# Patient Record
Sex: FEMALE | Race: ASIAN | Employment: OTHER | URBAN - METROPOLITAN AREA
[De-identification: names, ages, dates, MRNs, and addresses within clinical notes are randomized per-mention and may not be internally consistent; named-entity substitution may affect disease eponyms.]

---

## 2017-01-27 LAB
AMB EXT CHOLESTEROL, TOTAL: 270 MG/DL
AMB EXT HDL CHOLESTEROL: 49 MG/DL
AMB EXT LDL CHOLESTEROL, DIRECT: 192 MG/DL
AMB EXT TRIGLYCERIDES: 143 MG/DL

## 2017-02-02 ENCOUNTER — TELEPHONE (OUTPATIENT)
Dept: FAMILY MEDICINE CLINIC | Facility: CLINIC | Age: 67
End: 2017-02-02

## 2017-02-15 ENCOUNTER — OFFICE VISIT (OUTPATIENT)
Dept: FAMILY MEDICINE CLINIC | Facility: CLINIC | Age: 67
End: 2017-02-15

## 2017-02-15 VITALS
DIASTOLIC BLOOD PRESSURE: 86 MMHG | WEIGHT: 150 LBS | HEIGHT: 59 IN | BODY MASS INDEX: 30.24 KG/M2 | SYSTOLIC BLOOD PRESSURE: 146 MMHG | HEART RATE: 74 BPM | TEMPERATURE: 98 F

## 2017-02-15 DIAGNOSIS — E78.00 HYPERCHOLESTEREMIA: ICD-10-CM

## 2017-02-15 DIAGNOSIS — E66.3 OVERWEIGHT: ICD-10-CM

## 2017-02-15 DIAGNOSIS — Z00.00 WELL ADULT EXAM: Primary | ICD-10-CM

## 2017-02-15 DIAGNOSIS — Z12.11 COLON CANCER SCREENING: ICD-10-CM

## 2017-02-15 PROCEDURE — 99387 INIT PM E/M NEW PAT 65+ YRS: CPT | Performed by: FAMILY MEDICINE

## 2017-02-15 RX ORDER — PRAVASTATIN SODIUM 40 MG
40 TABLET ORAL NIGHTLY
COMMUNITY
End: 2017-08-16

## 2017-02-15 NOTE — PROGRESS NOTES
HPI:   Anjali Geronimo is a 78year old female who presents for a complete physical exam. Symptoms: is menopausal.    Switching care from previous md, in [place] Readings from Last 6 Encounters:  02/15/17 : 150 lb (68.04 kg)    Body mass index is 30. 2 146/86 mmHg  Pulse 74  Temp(Src) 97.9 °F (36.6 °C) (Oral)  Ht 4' 11\" (1.499 m)  Wt 150 lb (68.04 kg)  BMI 30.28 kg/m2  Body mass index is 30.28 kg/(m^2).    GENERAL: well developed, well nourished,in no apparent distress  SKIN: no rashes,no suspicious lesi

## 2017-02-20 ENCOUNTER — TELEPHONE (OUTPATIENT)
Dept: FAMILY MEDICINE CLINIC | Facility: CLINIC | Age: 67
End: 2017-02-20

## 2017-02-20 ENCOUNTER — OFFICE VISIT (OUTPATIENT)
Dept: FAMILY MEDICINE CLINIC | Facility: CLINIC | Age: 67
End: 2017-02-20

## 2017-02-20 VITALS
HEART RATE: 78 BPM | BODY MASS INDEX: 29.45 KG/M2 | DIASTOLIC BLOOD PRESSURE: 79 MMHG | WEIGHT: 150 LBS | SYSTOLIC BLOOD PRESSURE: 135 MMHG | HEIGHT: 60 IN | TEMPERATURE: 98 F

## 2017-02-20 DIAGNOSIS — N39.0 URINARY TRACT INFECTION, SITE UNSPECIFIED: Primary | ICD-10-CM

## 2017-02-20 LAB
APPEARANCE: CLEAR
MULTISTIX LOT#: NORMAL NUMERIC
PH, URINE: 6 (ref 4.5–8)
SPECIFIC GRAVITY: 1.01 (ref 1–1.03)
URINE-COLOR: YELLOW
UROBILINOGEN,SEMI-QN: 0.2 MG/DL (ref 0–1.9)

## 2017-02-20 PROCEDURE — 99212 OFFICE O/P EST SF 10 MIN: CPT | Performed by: PHYSICIAN ASSISTANT

## 2017-02-20 PROCEDURE — 81002 URINALYSIS NONAUTO W/O SCOPE: CPT | Performed by: PHYSICIAN ASSISTANT

## 2017-02-20 PROCEDURE — 99213 OFFICE O/P EST LOW 20 MIN: CPT | Performed by: PHYSICIAN ASSISTANT

## 2017-02-20 RX ORDER — NITROFURANTOIN 25; 75 MG/1; MG/1
100 CAPSULE ORAL 2 TIMES DAILY
Qty: 14 CAPSULE | Refills: 0 | Status: SHIPPED | OUTPATIENT
Start: 2017-02-20 | End: 2017-02-20

## 2017-02-20 RX ORDER — CIPROFLOXACIN 500 MG/1
500 TABLET, FILM COATED ORAL 2 TIMES DAILY
Qty: 14 TABLET | Refills: 0 | Status: SHIPPED | OUTPATIENT
Start: 2017-02-20 | End: 2017-02-27

## 2017-02-20 NOTE — TELEPHONE ENCOUNTER
Reason for Call/Chief Complaint: ?UTI  Onset: 3 days ago  Nursing Assessment/Associated Symptoms: burning, frequency  ____________________________________________________  Medication List reviewed: no  Allergies verified: yes  _____________________________

## 2017-02-20 NOTE — TELEPHONE ENCOUNTER
Pt is calling regarding a medication that was by her previous doc   Pt is having some side effects burning sensation after using the bathroom  Pt has already stopped taking the the medication and is still having the side effects   Please advise

## 2017-02-21 NOTE — PROGRESS NOTES
HPI:    Patient ID: Ty Ghotra is a 78year old female. HPI Comments: Patient presents complaining of burning with urination for past three days. She has intermittent pain lower abdomen. She denies flank pain, fever or vomiting.   She states had si encounter diagnosis)  -Cipro 500 mg BID for 7 days was sent to pharmacy. Medication discussed. Advised patient to push fluids. Proper urinary hygiene discussed.   Advised patient to follow up in one week if symptoms persist or sooner with any symptom wor

## 2017-02-23 ENCOUNTER — TELEPHONE (OUTPATIENT)
Dept: FAMILY MEDICINE CLINIC | Facility: CLINIC | Age: 67
End: 2017-02-23

## 2017-02-23 NOTE — TELEPHONE ENCOUNTER
Daughter calling in states pt is going out of country and needs a meningitis vaccine. Daughter states she needs this asap, due to leaving on 03/07    Please advise.      Call, [phone]

## 2017-02-28 NOTE — TELEPHONE ENCOUNTER
Patient can get meningococcal vaccine if she is traveling to [place]    See  CDC recommendations    All adults and children >3years of age must have received a single dose of quadrivalent A/C/Y/W-135 vaccine and must show proof of vaccination on a valid Int

## 2017-03-01 ENCOUNTER — OFFICE VISIT (OUTPATIENT)
Dept: FAMILY MEDICINE CLINIC | Facility: CLINIC | Age: 67
End: 2017-03-01

## 2017-03-01 VITALS
HEIGHT: 60 IN | BODY MASS INDEX: 29.45 KG/M2 | WEIGHT: 150 LBS | TEMPERATURE: 98 F | HEART RATE: 73 BPM | DIASTOLIC BLOOD PRESSURE: 69 MMHG | SYSTOLIC BLOOD PRESSURE: 113 MMHG

## 2017-03-01 DIAGNOSIS — N30.00 ACUTE CYSTITIS WITHOUT HEMATURIA: Primary | ICD-10-CM

## 2017-03-01 LAB
APPEARANCE: CLEAR
BACTERIA UR QL AUTO: NEGATIVE /HPF
BILIRUB UR QL: NEGATIVE
BILIRUBIN: NEGATIVE
CLARITY UR: CLEAR
COLOR UR: COLORLESS
GLUCOSE (URINE DIPSTICK): NEGATIVE MG/DL
GLUCOSE UR-MCNC: NEGATIVE MG/DL
KETONES (URINE DIPSTICK): NEGATIVE MG/DL
KETONES UR-MCNC: NEGATIVE MG/DL
LEUKOCYTE ESTERASE UR QL STRIP.AUTO: NEGATIVE
LEUKOCYTES: NEGATIVE
MULTISTIX LOT#: NORMAL NUMERIC
NITRITE UR QL STRIP.AUTO: NEGATIVE
NITRITE, URINE: NEGATIVE
PH UR: 6 [PH] (ref 5–8)
PH, URINE: 5 (ref 4.5–8)
PROT UR-MCNC: NEGATIVE MG/DL
PROTEIN (URINE DIPSTICK): NEGATIVE MG/DL
RBC #/AREA URNS AUTO: 0 /HPF
SP GR UR STRIP: 1.01 (ref 1–1.03)
SPECIFIC GRAVITY: 1 (ref 1–1.03)
URINE-COLOR: YELLOW
UROBILINOGEN UR STRIP-ACNC: <2
UROBILINOGEN,SEMI-QN: 0.2 MG/DL (ref 0–1.9)
VIT C UR-MCNC: NEGATIVE MG/DL
WBC #/AREA URNS AUTO: 0 /HPF

## 2017-03-01 PROCEDURE — 99212 OFFICE O/P EST SF 10 MIN: CPT | Performed by: FAMILY MEDICINE

## 2017-03-01 PROCEDURE — 81002 URINALYSIS NONAUTO W/O SCOPE: CPT | Performed by: FAMILY MEDICINE

## 2017-03-01 PROCEDURE — 99213 OFFICE O/P EST LOW 20 MIN: CPT | Performed by: FAMILY MEDICINE

## 2017-03-01 NOTE — PROGRESS NOTES
HPI:    Patient ID: Noah Yates is a 78year old female. HPI     Patient here for follow up  UTI  Feels better  Feels some urinary discharge, clear  Not sure if vaginal/ urinary.   Declines urine incontinence    Review of Systems   Constitutional: Neg

## 2017-03-02 ENCOUNTER — TELEPHONE (OUTPATIENT)
Dept: FAMILY MEDICINE CLINIC | Facility: CLINIC | Age: 67
End: 2017-03-02

## 2017-03-02 NOTE — TELEPHONE ENCOUNTER
----- Message from Sallie Grady MD sent at 3/2/2017  4:40 PM CST -----  No red blood cells seen on urinalysis. pls inform patient.  No medication needed  Continue to push water intake 8-10 glasses daily  Also inform patient records from [facility]

## 2017-08-15 ENCOUNTER — TELEPHONE (OUTPATIENT)
Dept: FAMILY MEDICINE CLINIC | Facility: CLINIC | Age: 67
End: 2017-08-15

## 2017-08-15 NOTE — TELEPHONE ENCOUNTER
Actions Requested: Appointment made for tomorrow at 1pm with Dr Rissa Zavaleta at [facility]  Problem: abdominal pain  Onset and Timin week, on and off  Associated Symptoms: diarrhea-3 days ago  Aggravating by:    Alleviated by:   Triage Note: Daughter with patient

## 2017-08-16 ENCOUNTER — OFFICE VISIT (OUTPATIENT)
Dept: FAMILY MEDICINE CLINIC | Facility: CLINIC | Age: 67
End: 2017-08-16

## 2017-08-16 VITALS
WEIGHT: 152 LBS | HEART RATE: 77 BPM | TEMPERATURE: 98 F | HEIGHT: 60 IN | SYSTOLIC BLOOD PRESSURE: 133 MMHG | DIASTOLIC BLOOD PRESSURE: 79 MMHG | BODY MASS INDEX: 29.84 KG/M2

## 2017-08-16 DIAGNOSIS — L81.8 POSTINFLAMMATORY HYPOPIGMENTATION: ICD-10-CM

## 2017-08-16 DIAGNOSIS — Z12.11 COLON CANCER SCREENING: ICD-10-CM

## 2017-08-16 DIAGNOSIS — R10.84 GENERALIZED ABDOMINAL PAIN: Primary | ICD-10-CM

## 2017-08-16 DIAGNOSIS — E78.00 HYPERCHOLESTEREMIA: ICD-10-CM

## 2017-08-16 DIAGNOSIS — R31.29 MICROSCOPIC HEMATURIA: ICD-10-CM

## 2017-08-16 DIAGNOSIS — H26.8 OTHER CATARACT OF BOTH EYES: ICD-10-CM

## 2017-08-16 LAB
APPEARANCE: CLEAR
BACTERIA UR QL AUTO: NEGATIVE /HPF
BILIRUB UR QL: NEGATIVE
BILIRUBIN: NEGATIVE
CLARITY UR: CLEAR
COLOR UR: YELLOW
GLUCOSE (URINE DIPSTICK): NEGATIVE MG/DL
GLUCOSE UR-MCNC: NEGATIVE MG/DL
KETONES (URINE DIPSTICK): NEGATIVE MG/DL
KETONES UR-MCNC: NEGATIVE MG/DL
LEUKOCYTE ESTERASE UR QL STRIP.AUTO: NEGATIVE
LEUKOCYTES: NEGATIVE
NITRITE UR QL STRIP.AUTO: NEGATIVE
NITRITE, URINE: NEGATIVE
PH UR: 7 [PH] (ref 5–8)
PH, URINE: 7 (ref 4.5–8)
PROT UR-MCNC: NEGATIVE MG/DL
PROTEIN (URINE DIPSTICK): NEGATIVE MG/DL
RBC #/AREA URNS AUTO: <1 /HPF
SP GR UR STRIP: 1.01 (ref 1–1.03)
SPECIFIC GRAVITY: 1 (ref 1–1.03)
UROBILINOGEN UR STRIP-ACNC: <2
UROBILINOGEN,SEMI-QN: 0.2 MG/DL (ref 0–1.9)
VIT C UR-MCNC: NEGATIVE MG/DL
WBC #/AREA URNS AUTO: <1 /HPF

## 2017-08-16 PROCEDURE — 81000 URINALYSIS NONAUTO W/SCOPE: CPT | Performed by: FAMILY MEDICINE

## 2017-08-16 PROCEDURE — 99214 OFFICE O/P EST MOD 30 MIN: CPT | Performed by: FAMILY MEDICINE

## 2017-08-16 PROCEDURE — 99212 OFFICE O/P EST SF 10 MIN: CPT | Performed by: FAMILY MEDICINE

## 2017-08-16 RX ORDER — CIPROFLOXACIN 250 MG/1
250 TABLET, FILM COATED ORAL 2 TIMES DAILY
Qty: 6 TABLET | Refills: 0 | Status: SHIPPED | OUTPATIENT
Start: 2017-08-16 | End: 2017-08-19

## 2017-08-16 NOTE — PROGRESS NOTES
HPI:    Patient ID: Flaquita Silverio is a 75year old female. Abdominal Pain   This is a new problem. Episode onset: 1 WEEK. The onset quality is gradual. The problem occurs intermittently. The problem has been gradually improving.  The pain is located in Soft. Bowel sounds are normal. There is no hepatosplenomegaly. There is tenderness. There is no guarding and no CVA tenderness.    MILD LEFT SIDE TENDERNESS              ASSESSMENT/PLAN:   Generalized abdominal pain  (primary encounter diagnosis)  Hyperchol

## 2017-08-17 ENCOUNTER — LAB ENCOUNTER (OUTPATIENT)
Dept: LAB | Age: 67
End: 2017-08-17
Attending: FAMILY MEDICINE
Payer: COMMERCIAL

## 2017-08-17 DIAGNOSIS — E78.00 HYPERCHOLESTEREMIA: ICD-10-CM

## 2017-08-17 DIAGNOSIS — R10.84 GENERALIZED ABDOMINAL PAIN: ICD-10-CM

## 2017-08-17 LAB
ALT SERPL-CCNC: 16 U/L (ref 14–54)
ANION GAP SERPL CALC-SCNC: 10 MMOL/L (ref 0–18)
AST SERPL-CCNC: 21 U/L (ref 15–41)
BASOPHILS # BLD: 0 K/UL (ref 0–0.2)
BASOPHILS NFR BLD: 1 %
BUN SERPL-MCNC: 12 MG/DL (ref 8–20)
BUN/CREAT SERPL: 14.8 (ref 10–20)
CALCIUM SERPL-MCNC: 9.8 MG/DL (ref 8.5–10.5)
CHLORIDE SERPL-SCNC: 103 MMOL/L (ref 95–110)
CHOLEST SERPL-MCNC: 294 MG/DL (ref 110–200)
CO2 SERPL-SCNC: 25 MMOL/L (ref 22–32)
CREAT SERPL-MCNC: 0.81 MG/DL (ref 0.5–1.5)
EOSINOPHIL # BLD: 0.2 K/UL (ref 0–0.7)
EOSINOPHIL NFR BLD: 2 %
ERYTHROCYTE [DISTWIDTH] IN BLOOD BY AUTOMATED COUNT: 13.6 % (ref 11–15)
GLUCOSE SERPL-MCNC: 99 MG/DL (ref 70–99)
HCT VFR BLD AUTO: 42.7 % (ref 35–48)
HDLC SERPL-MCNC: 50 MG/DL
HGB BLD-MCNC: 14.6 G/DL (ref 12–16)
LDLC SERPL CALC-MCNC: 214 MG/DL (ref 0–99)
LYMPHOCYTES # BLD: 2 K/UL (ref 1–4)
LYMPHOCYTES NFR BLD: 31 %
MCH RBC QN AUTO: 29.2 PG (ref 27–32)
MCHC RBC AUTO-ENTMCNC: 34.3 G/DL (ref 32–37)
MCV RBC AUTO: 84.9 FL (ref 80–100)
MONOCYTES # BLD: 0.5 K/UL (ref 0–1)
MONOCYTES NFR BLD: 8 %
NEUTROPHILS # BLD AUTO: 3.9 K/UL (ref 1.8–7.7)
NEUTROPHILS NFR BLD: 59 %
NONHDLC SERPL-MCNC: 244 MG/DL
OSMOLALITY UR CALC.SUM OF ELEC: 286 MOSM/KG (ref 275–295)
PLATELET # BLD AUTO: 217 K/UL (ref 140–400)
PMV BLD AUTO: 7.7 FL (ref 7.4–10.3)
POTASSIUM SERPL-SCNC: 3.9 MMOL/L (ref 3.3–5.1)
RBC # BLD AUTO: 5.02 M/UL (ref 3.7–5.4)
SODIUM SERPL-SCNC: 138 MMOL/L (ref 136–144)
TRIGL SERPL-MCNC: 150 MG/DL (ref 1–149)
WBC # BLD AUTO: 6.7 K/UL (ref 4–11)

## 2017-08-17 PROCEDURE — 80061 LIPID PANEL: CPT

## 2017-08-17 PROCEDURE — 84460 ALANINE AMINO (ALT) (SGPT): CPT

## 2017-08-17 PROCEDURE — 84450 TRANSFERASE (AST) (SGOT): CPT

## 2017-08-17 PROCEDURE — 80048 BASIC METABOLIC PNL TOTAL CA: CPT

## 2017-08-17 PROCEDURE — 85025 COMPLETE CBC W/AUTO DIFF WBC: CPT

## 2017-08-17 PROCEDURE — 36415 COLL VENOUS BLD VENIPUNCTURE: CPT

## 2017-08-26 ENCOUNTER — APPOINTMENT (OUTPATIENT)
Dept: LAB | Facility: HOSPITAL | Age: 67
End: 2017-08-26
Attending: FAMILY MEDICINE
Payer: COMMERCIAL

## 2017-08-26 DIAGNOSIS — R10.84 GENERALIZED ABDOMINAL PAIN: ICD-10-CM

## 2017-08-26 DIAGNOSIS — Z12.11 COLON CANCER SCREENING: ICD-10-CM

## 2017-08-26 LAB — HEMOCCULT STL QL: NEGATIVE

## 2017-08-26 PROCEDURE — 82274 ASSAY TEST FOR BLOOD FECAL: CPT

## 2017-08-26 PROCEDURE — 87338 HPYLORI STOOL AG IA: CPT

## 2017-08-28 LAB — HELICOBACTER PYLORI AG, FECAL: NEGATIVE

## 2017-09-06 ENCOUNTER — TELEPHONE (OUTPATIENT)
Dept: FAMILY MEDICINE CLINIC | Facility: CLINIC | Age: 67
End: 2017-09-06

## 2017-09-06 NOTE — TELEPHONE ENCOUNTER
----- Message from Rosa Elena MD sent at 9/2/2017  9:41 PM CDT -----  Test for bacteria and stool is negative as well as stool is negative for blood. Please call to inform patient.

## 2017-09-26 ENCOUNTER — OFFICE VISIT (OUTPATIENT)
Dept: FAMILY MEDICINE CLINIC | Facility: CLINIC | Age: 67
End: 2017-09-26

## 2017-09-26 VITALS
HEART RATE: 76 BPM | BODY MASS INDEX: 29.64 KG/M2 | HEIGHT: 60 IN | DIASTOLIC BLOOD PRESSURE: 70 MMHG | WEIGHT: 151 LBS | SYSTOLIC BLOOD PRESSURE: 106 MMHG | TEMPERATURE: 98 F

## 2017-09-26 DIAGNOSIS — L81.8 POSTINFLAMMATORY HYPOPIGMENTATION: ICD-10-CM

## 2017-09-26 DIAGNOSIS — Z63.6 CAREGIVER STRESS: ICD-10-CM

## 2017-09-26 DIAGNOSIS — E78.00 HYPERCHOLESTEREMIA: Primary | ICD-10-CM

## 2017-09-26 DIAGNOSIS — Z23 NEED FOR INFLUENZA VACCINATION: ICD-10-CM

## 2017-09-26 DIAGNOSIS — R21 FACIAL RASH: ICD-10-CM

## 2017-09-26 PROCEDURE — 99212 OFFICE O/P EST SF 10 MIN: CPT | Performed by: FAMILY MEDICINE

## 2017-09-26 PROCEDURE — 99214 OFFICE O/P EST MOD 30 MIN: CPT | Performed by: FAMILY MEDICINE

## 2017-09-26 PROCEDURE — 90471 IMMUNIZATION ADMIN: CPT | Performed by: FAMILY MEDICINE

## 2017-09-26 PROCEDURE — 90653 IIV ADJUVANT VACCINE IM: CPT | Performed by: FAMILY MEDICINE

## 2017-09-26 RX ORDER — ROSUVASTATIN CALCIUM 10 MG/1
10 TABLET, COATED ORAL NIGHTLY
Qty: 90 TABLET | Refills: 0 | Status: SHIPPED | OUTPATIENT
Start: 2017-09-26 | End: 2018-01-04

## 2017-09-26 SDOH — SOCIAL STABILITY - SOCIAL INSECURITY: DEPENDENT RELATIVE NEEDING CARE AT HOME: Z63.6

## 2017-09-26 NOTE — PROGRESS NOTES
HPI:    Patient ID: Joycie Bloch is a 75year old female.     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            Im

## 2017-12-26 ENCOUNTER — HOSPITAL ENCOUNTER (EMERGENCY)
Facility: HOSPITAL | Age: 67
Discharge: LEFT BEFORE DISCHARGE | End: 2017-12-26
Payer: COMMERCIAL

## 2017-12-26 ENCOUNTER — APPOINTMENT (OUTPATIENT)
Dept: LAB | Facility: HOSPITAL | Age: 67
End: 2017-12-26
Attending: FAMILY MEDICINE
Payer: COMMERCIAL

## 2017-12-26 DIAGNOSIS — E78.00 HYPERCHOLESTEREMIA: ICD-10-CM

## 2017-12-26 PROCEDURE — 36415 COLL VENOUS BLD VENIPUNCTURE: CPT

## 2017-12-26 PROCEDURE — 80061 LIPID PANEL: CPT

## 2017-12-26 PROCEDURE — 80053 COMPREHEN METABOLIC PANEL: CPT

## 2018-01-04 ENCOUNTER — OFFICE VISIT (OUTPATIENT)
Dept: FAMILY MEDICINE CLINIC | Facility: CLINIC | Age: 68
End: 2018-01-04

## 2018-01-04 VITALS
TEMPERATURE: 98 F | DIASTOLIC BLOOD PRESSURE: 76 MMHG | HEART RATE: 73 BPM | HEIGHT: 60 IN | SYSTOLIC BLOOD PRESSURE: 124 MMHG | WEIGHT: 152 LBS | BODY MASS INDEX: 29.84 KG/M2

## 2018-01-04 DIAGNOSIS — N30.00 ACUTE CYSTITIS WITHOUT HEMATURIA: ICD-10-CM

## 2018-01-04 DIAGNOSIS — R12 HEARTBURN: ICD-10-CM

## 2018-01-04 DIAGNOSIS — E78.00 HYPERCHOLESTEREMIA: Primary | ICD-10-CM

## 2018-01-04 DIAGNOSIS — R10.13 EPIGASTRIC PAIN: ICD-10-CM

## 2018-01-04 PROCEDURE — 99214 OFFICE O/P EST MOD 30 MIN: CPT | Performed by: FAMILY MEDICINE

## 2018-01-04 PROCEDURE — 99212 OFFICE O/P EST SF 10 MIN: CPT | Performed by: FAMILY MEDICINE

## 2018-01-04 RX ORDER — ROSUVASTATIN CALCIUM 10 MG/1
10 TABLET, COATED ORAL NIGHTLY
Qty: 90 TABLET | Refills: 3 | Status: SHIPPED | OUTPATIENT
Start: 2018-01-04 | End: 2018-04-16

## 2018-01-04 RX ORDER — RANITIDINE 150 MG/1
150 TABLET ORAL 2 TIMES DAILY
Qty: 60 TABLET | Refills: 3 | Status: SHIPPED | OUTPATIENT
Start: 2018-01-04 | End: 2018-01-15 | Stop reason: ALTCHOICE

## 2018-01-04 NOTE — PROGRESS NOTES
HPI:    Patient ID: Cici Donis is a 75year old female. HPI     Patient here for follow-up. She states she was taking cholesterol medication but that she thought she was having side effects with urinary burning.   She did go to [facility] on December 29 an ASSESSMENT/PLAN:   Hypercholesteremia  (primary encounter diagnosis)  Acute cystitis without hematuria  Epigastric pain  Heartburn    1. Hypercholesteremia  Lipids slightly better but still very elevated.   Reviewed again low-fat low-cholesterol diet and

## 2018-01-15 ENCOUNTER — TELEPHONE (OUTPATIENT)
Dept: FAMILY MEDICINE CLINIC | Facility: CLINIC | Age: 68
End: 2018-01-15

## 2018-01-15 NOTE — TELEPHONE ENCOUNTER
Daughter in law calling regarding pt taking RaNITidine HCl 150 MG Oral Tab. Pt wants to change medication to omeprazol 20MG. ..please advise

## 2018-01-25 ENCOUNTER — OFFICE VISIT (OUTPATIENT)
Dept: OPHTHALMOLOGY | Facility: CLINIC | Age: 68
End: 2018-01-25

## 2018-01-25 DIAGNOSIS — H43.393 FLOATER, VITREOUS, BILATERAL: ICD-10-CM

## 2018-01-25 DIAGNOSIS — H25.13 AGE-RELATED NUCLEAR CATARACT OF BOTH EYES: ICD-10-CM

## 2018-01-25 DIAGNOSIS — H40.003 GLAUCOMA SUSPECT OF BOTH EYES: Primary | ICD-10-CM

## 2018-01-25 PROCEDURE — 99243 OFF/OP CNSLTJ NEW/EST LOW 30: CPT | Performed by: OPHTHALMOLOGY

## 2018-01-25 PROCEDURE — 92015 DETERMINE REFRACTIVE STATE: CPT | Performed by: OPHTHALMOLOGY

## 2018-01-25 PROCEDURE — 92250 FUNDUS PHOTOGRAPHY W/I&R: CPT | Performed by: OPHTHALMOLOGY

## 2018-01-25 PROCEDURE — 99212 OFFICE O/P EST SF 10 MIN: CPT | Performed by: OPHTHALMOLOGY

## 2018-01-25 NOTE — PATIENT INSTRUCTIONS
Age-related nuclear cataract of both eyes  Discussed moderate cataracts with patient. No treatment recommended at this time. New glasses today; suggest update. Floater, vitreous, bilateral  No treatment.      Glaucoma suspect of both eyes  Discussed

## 2018-01-25 NOTE — PROGRESS NOTES
Sosa Devine is a 75year old female. HPI:     HPI     Consult    Additional comments: Consult per Dr. Rachel Ferrara   NP  Patient is here for a complete eye exam.  Patient denies any problems or changes with her eyes.   She says she was last cc 20/25 -3 20/25 +2    Dist ph cc NI NI    Near cc 20/20 20/20          Tonometry (Applanation, 1:40 PM)       Right Left    Pressure 17 17          Pupils       Pupils    Right PERRL    Left PERRL          Visual Fields       Left Right     Full Full nerve in the left eye. Retinal photos taken today to document optic nerves. Glaucoma diagnostic testing ordered. Will not start medication, but will continue to observe. Patient verbalized understanding.         Orders Placed This Encounter      Fundus

## 2018-01-25 NOTE — ASSESSMENT & PLAN NOTE
Discussed moderate cataracts with patient. No treatment recommended at this time. New glasses today; suggest update.

## 2018-01-25 NOTE — ASSESSMENT & PLAN NOTE
Discussed with patient that she is a glaucoma suspect based on increased cupping of the optic nerve in the left eye. Retinal photos taken today to document optic nerves. Glaucoma diagnostic testing ordered.   Will not start medication, but will continue

## 2018-01-31 ENCOUNTER — OFFICE VISIT (OUTPATIENT)
Dept: DERMATOLOGY CLINIC | Facility: CLINIC | Age: 68
End: 2018-01-31

## 2018-01-31 DIAGNOSIS — L81.9 DYSCHROMIA: Primary | ICD-10-CM

## 2018-01-31 PROCEDURE — 99212 OFFICE O/P EST SF 10 MIN: CPT | Performed by: DERMATOLOGY

## 2018-01-31 PROCEDURE — 99202 OFFICE O/P NEW SF 15 MIN: CPT | Performed by: DERMATOLOGY

## 2018-01-31 RX ORDER — TRIAMCINOLONE ACETONIDE 5 MG/G
CREAM TOPICAL
Qty: 30 G | Refills: 1 | Status: SHIPPED | OUTPATIENT
Start: 2018-01-31 | End: 2018-09-10

## 2018-02-09 ENCOUNTER — TELEPHONE (OUTPATIENT)
Dept: OPHTHALMOLOGY | Facility: CLINIC | Age: 68
End: 2018-02-09

## 2018-02-09 DIAGNOSIS — H40.003 GLAUCOMA SUSPECT OF BOTH EYES: Primary | ICD-10-CM

## 2018-02-09 NOTE — TELEPHONE ENCOUNTER
Patient has an appointment with Dr. Rosi Miranda on 2/15/18  for a visual field and OCT. He needs another referral for **Glaucoma suspect of both eyes* Can you please order a referral? Thank you, [place] [phone] if you have any questions.

## 2018-02-12 NOTE — PROGRESS NOTES
Cameron Childers is a 75year old female. Patient presents with:  Derm Problem: New pt presenting with hypopigmentation to chin. Pt states it started 18 months prior.             Penicillins; Pravastatin    Current Outpatient Prescriptions:  triamcinolon History   Problem Relation Age of Onset   • Cataracts Mother    • Lipids Sister    • Cancer Neg    • Diabetes Neg    • Heart Disorder Neg    • Hypertension Neg    • Glaucoma Neg    • Macular degeneration Neg                       HPI :      Patient present hydroquinone as this may worsen the hypopigmentation. Consider over-the-counter clotrimazole, Neosporin along with the triamcinolone to the lateral oral commissures. TSH in future, possibly checking B12 as well.   Avoidance of irritating dental care pro

## 2018-02-15 ENCOUNTER — OFFICE VISIT (OUTPATIENT)
Dept: OPHTHALMOLOGY | Facility: CLINIC | Age: 68
End: 2018-02-15

## 2018-02-15 DIAGNOSIS — H40.003 GLAUCOMA SUSPECT OF BOTH EYES: ICD-10-CM

## 2018-02-15 PROCEDURE — 92083 EXTENDED VISUAL FIELD XM: CPT | Performed by: OPHTHALMOLOGY

## 2018-02-15 PROCEDURE — 76514 ECHO EXAM OF EYE THICKNESS: CPT | Performed by: OPHTHALMOLOGY

## 2018-02-15 PROCEDURE — 92133 CPTRZD OPH DX IMG PST SGM ON: CPT | Performed by: OPHTHALMOLOGY

## 2018-02-15 NOTE — PROGRESS NOTES
Nicolás Jackson is a 75year old female.     HPI:     HPI     Here for a VF, OCT and PACHY with no MD.     Last edited by Gina Lino O.T. on 2/15/2018 11:14 AM. (History)        Patient History:  Past Medical History:   Diagnosis Date   • Arthritis

## 2018-02-16 ENCOUNTER — TELEPHONE (OUTPATIENT)
Dept: OPHTHALMOLOGY | Facility: CLINIC | Age: 68
End: 2018-02-16

## 2018-02-16 NOTE — TELEPHONE ENCOUNTER
Patients daughter states patient missed a call yesterday after her visit with JANESSA. Is concerned it is in regards to patients eye exam results. Requesting a call back. Please call. Thank you.

## 2018-02-16 NOTE — TELEPHONE ENCOUNTER
LM to let pts daughter know that pt needs to come back in 1 year Jan 2019 for dilated EE with JANESSA.

## 2018-03-26 ENCOUNTER — OFFICE VISIT (OUTPATIENT)
Dept: OBGYN CLINIC | Facility: CLINIC | Age: 68
End: 2018-03-26

## 2018-03-26 VITALS
TEMPERATURE: 98 F | BODY MASS INDEX: 29 KG/M2 | DIASTOLIC BLOOD PRESSURE: 87 MMHG | HEART RATE: 78 BPM | WEIGHT: 151 LBS | SYSTOLIC BLOOD PRESSURE: 132 MMHG

## 2018-03-26 DIAGNOSIS — N94.9 VAGINAL BURNING: Primary | ICD-10-CM

## 2018-03-26 PROCEDURE — 99203 OFFICE O/P NEW LOW 30 MIN: CPT | Performed by: ADVANCED PRACTICE MIDWIFE

## 2018-03-26 RX ORDER — NITROFURANTOIN 25; 75 MG/1; MG/1
CAPSULE ORAL
COMMUNITY
Start: 2018-03-21 | End: 2018-04-16

## 2018-03-26 NOTE — PROGRESS NOTES
HPI:    Patient ID: Nicolás Jackson is a 75year old female. She is a postmenopausal female who presents with concerns of vaginal burning. She has a history UTIs as well as one yeast infection 2 years ago. She had a history of pyelonephritis as well. tenderness and no fullness. Left adnexum displays no mass, no tenderness and no fullness. There is tenderness in the vagina. No erythema or bleeding in the vagina. No foreign body in the vagina. No signs of injury around the vagina.  No vaginal discharge fo

## 2018-03-28 ENCOUNTER — TELEPHONE (OUTPATIENT)
Dept: OBGYN CLINIC | Facility: CLINIC | Age: 68
End: 2018-03-28

## 2018-03-28 LAB — TRICHOMONAS SCREEN: NEGATIVE

## 2018-03-28 RX ORDER — ESTRADIOL 0.1 MG/G
1 CREAM VAGINAL DAILY
Qty: 1 TUBE | Refills: 2 | Status: SHIPPED | OUTPATIENT
Start: 2018-03-28 | End: 2018-04-16

## 2018-03-28 NOTE — TELEPHONE ENCOUNTER
Per [facility], pt does not have a yeast infection but rather atrophic vaginitis as discussed at the visit on Monday. Rx sent to pharmacy for estrace cream. Pt to apply 2gm daily x1 wk, then 1 gm daily x1 wk, then 1gm 3 times weekly x2 mos.     Spoke w/pt & ad

## 2018-04-02 ENCOUNTER — TELEPHONE (OUTPATIENT)
Dept: OBGYN CLINIC | Facility: CLINIC | Age: 68
End: 2018-04-02

## 2018-04-02 NOTE — TELEPHONE ENCOUNTER
Pt is having light bleeding, unsure if side effect from rx. Received pt's consent for daughter Kirstie Araujo to speak on her behalf due to language barrier. pls adv.

## 2018-04-02 NOTE — TELEPHONE ENCOUNTER
Pt taking estrace cream. Experiencing very mild vag spotting. Pt is postmenopausal. Reassured daughter that vag bleeding is a common side effect of cream as it is a hormone. Advised bleeding will disappear as pt weans dosage as prescribed.  Daughter asked i

## 2018-04-10 ENCOUNTER — TELEPHONE (OUTPATIENT)
Dept: OBGYN CLINIC | Facility: CLINIC | Age: 68
End: 2018-04-10

## 2018-04-10 NOTE — TELEPHONE ENCOUNTER
Pt states she has been using the Estrace Cream but not as directed. The first 2 weeks, pt used 2 gm per vagina three times a day, the 3rd week pt used 1 gm per vagina two times a day. Pt states yesterday she had a lot of vaginal d/c and abdominal pain that she rated at a 6/10. Pt did not take anything for the pain but she rested. Today, pt is not having any vaginal d/c and she is not having any pain. Pt is unsure what to do. Pt was advised to monitor her symptoms and to call if the d/c or pain return. Pt was advised she is now to use the Estrace Cream 1 gm per vagina 3 x's a week for the next 2 months. Pt agrees with plan. Msg to [facility] to inform and give recs.

## 2018-04-10 NOTE — TELEPHONE ENCOUNTER
Still having vagina pain and discharge, per pt she did not follow directions on how to use the cream, pt thinks she over used the cream

## 2018-04-12 ENCOUNTER — TELEPHONE (OUTPATIENT)
Dept: FAMILY MEDICINE CLINIC | Facility: CLINIC | Age: 68
End: 2018-04-12

## 2018-04-12 ENCOUNTER — OFFICE VISIT (OUTPATIENT)
Dept: FAMILY MEDICINE CLINIC | Facility: CLINIC | Age: 68
End: 2018-04-12

## 2018-04-12 VITALS
BODY MASS INDEX: 29.45 KG/M2 | TEMPERATURE: 98 F | HEIGHT: 60 IN | SYSTOLIC BLOOD PRESSURE: 123 MMHG | WEIGHT: 150 LBS | DIASTOLIC BLOOD PRESSURE: 77 MMHG | HEART RATE: 75 BPM

## 2018-04-12 DIAGNOSIS — R35.0 URINARY FREQUENCY: Primary | ICD-10-CM

## 2018-04-12 DIAGNOSIS — N95.2 ATROPHIC VAGINITIS: ICD-10-CM

## 2018-04-12 DIAGNOSIS — N94.9 VAGINAL BURNING: ICD-10-CM

## 2018-04-12 PROCEDURE — 99214 OFFICE O/P EST MOD 30 MIN: CPT | Performed by: FAMILY MEDICINE

## 2018-04-12 PROCEDURE — 81002 URINALYSIS NONAUTO W/O SCOPE: CPT | Performed by: FAMILY MEDICINE

## 2018-04-12 PROCEDURE — 99212 OFFICE O/P EST SF 10 MIN: CPT | Performed by: FAMILY MEDICINE

## 2018-04-12 RX ORDER — ROSUVASTATIN CALCIUM 10 MG/1
TABLET, COATED ORAL
COMMUNITY
Start: 2018-01-05 | End: 2018-04-12

## 2018-04-12 RX ORDER — TRIAMCINOLONE ACETONIDE 5 MG/G
CREAM TOPICAL
COMMUNITY
Start: 2018-01-31 | End: 2018-04-12

## 2018-04-12 RX ORDER — OMEPRAZOLE 20 MG/1
CAPSULE, DELAYED RELEASE ORAL
COMMUNITY
Start: 2018-02-26 | End: 2018-04-12

## 2018-04-12 RX ORDER — RANITIDINE 150 MG/1
TABLET ORAL
COMMUNITY
Start: 2018-01-04 | End: 2018-04-16

## 2018-04-12 NOTE — TELEPHONE ENCOUNTER
Called pt to drop off a urine sample  for a urine analysis at the lab, order is already in the system.

## 2018-04-12 NOTE — PROGRESS NOTES
HPI:    Patient ID: Sosa Devine is a 75year old female. HPI     Patient presents with:   Follow - Up: Bladder infection pt states she's feeling the same burning, white discharge, and  pain while urinating        Patient complained of urinary burning Nitrofurantoin Monohyd Macro 100 MG Oral Cap  Disp:  Rfl:      Allergies:  Penicillins             Pain  Pravastatin             Myalgia   PHYSICAL EXAM:   Physical Exam   Constitutional: She appears well-developed and well-nourished. Abdominal: Soft.

## 2018-04-14 ENCOUNTER — TELEPHONE (OUTPATIENT)
Dept: OBGYN CLINIC | Facility: CLINIC | Age: 68
End: 2018-04-14

## 2018-04-14 ENCOUNTER — NURSE TRIAGE (OUTPATIENT)
Dept: FAMILY MEDICINE CLINIC | Facility: CLINIC | Age: 68
End: 2018-04-14

## 2018-04-14 NOTE — TELEPHONE ENCOUNTER
Action Requested: Summary for Provider     []  Critical Lab, Recommendations Needed  [] Need Additional Advice  []   FYI    []   Need Orders  [] Need Medications Sent to Pharmacy  []  Other     SUMMARY: Advised assessment today but no openings left in of

## 2018-04-14 NOTE — TELEPHONE ENCOUNTER
Pt has been having vaginal bleeding starting today, very light, hasn't had to change a pad yet, no pain, no clots. On second day of monistat, (infored vaginal culture did not show a yeast infection) made pt appt for Monday with bertrand.

## 2018-04-16 ENCOUNTER — TELEPHONE (OUTPATIENT)
Dept: FAMILY MEDICINE CLINIC | Facility: CLINIC | Age: 68
End: 2018-04-16

## 2018-04-16 ENCOUNTER — OFFICE VISIT (OUTPATIENT)
Dept: OBGYN CLINIC | Facility: CLINIC | Age: 68
End: 2018-04-16

## 2018-04-16 VITALS
HEART RATE: 76 BPM | BODY MASS INDEX: 30 KG/M2 | SYSTOLIC BLOOD PRESSURE: 136 MMHG | WEIGHT: 152.19 LBS | DIASTOLIC BLOOD PRESSURE: 86 MMHG

## 2018-04-16 DIAGNOSIS — N93.9 ABNORMAL UTERINE BLEEDING: Primary | ICD-10-CM

## 2018-04-16 PROCEDURE — 81002 URINALYSIS NONAUTO W/O SCOPE: CPT | Performed by: ADVANCED PRACTICE MIDWIFE

## 2018-04-16 PROCEDURE — 99213 OFFICE O/P EST LOW 20 MIN: CPT | Performed by: ADVANCED PRACTICE MIDWIFE

## 2018-04-16 NOTE — PROGRESS NOTES
HPI:    Patient ID: Nicolás Jackson is a 75year old female. Patient presents with continued burning and vaginal bleeding. Patient was seen on 3/26 for complaints of burning after urination and at night.   She reported a slight increase in frequency at t the right labia. There is no rash, tenderness, lesion or injury on the left labia. Uterus is not deviated, not enlarged, not fixed and not tender. Cervix exhibits no motion tenderness, no discharge and no friability.  Right adnexum displays no mass, no tend

## 2018-04-16 NOTE — TELEPHONE ENCOUNTER
Chart reviewed, 4/12/18 office visit if vaginal symptoms persist follow up with gyne but the patient's appt is with the midwife, I advised her to keep appt, to f/u on spotting and continuing vaginal burning

## 2018-04-16 NOTE — TELEPHONE ENCOUNTER
Patient's daughter Rashaad Garcia calling back to report that the patient saw Dr Josie Alonzo on Thursday 4/12/18, started otc monistat and developed vaginal spotting on second day of monistat, also burning with urination.  She has appt with Midwife today at 1330 and won

## 2018-04-17 ENCOUNTER — TELEPHONE (OUTPATIENT)
Dept: OBGYN CLINIC | Facility: CLINIC | Age: 68
End: 2018-04-17

## 2018-04-17 NOTE — TELEPHONE ENCOUNTER
[facility] pharmacy called stated they are unable to order lidocaine 2 % for patient. 3 % cream, 4 % cream, and 5 % ointment is what they have available.  Per [facility] ok to give 3 % cream.    Spoke to pharmacist, stated none of them seem to be covered by patient'

## 2018-04-18 NOTE — TELEPHONE ENCOUNTER
Spoke with Ruiz Cotter the pharmacist and he stated the pt's insurance will not cover any of the Lidocaine's.

## 2018-04-18 NOTE — TELEPHONE ENCOUNTER
LMTCB. Need to advise pt that Lidocaine cream or ointment is not covered under her insurance. Per [facility] this medication was not going to treat anything it was to try to help numb the pain she was having. Pt is to be advised to increase hydration.

## 2018-04-18 NOTE — TELEPHONE ENCOUNTER
Spoke with pt and advised her that the Lidocaine that [facility] prescribed for her is not covered under her insurance. This medication was not going to treat anything but it was to try and help numb the pain she was having.   Pt was advised to increase her water

## 2018-04-24 RX ORDER — OMEPRAZOLE 20 MG/1
CAPSULE, DELAYED RELEASE ORAL
Qty: 30 CAPSULE | Refills: 2 | Status: SHIPPED | OUTPATIENT
Start: 2018-04-24 | End: 2018-07-17

## 2018-04-26 ENCOUNTER — OFFICE VISIT (OUTPATIENT)
Dept: DERMATOLOGY CLINIC | Facility: CLINIC | Age: 68
End: 2018-04-26

## 2018-04-26 DIAGNOSIS — L30.9 DERMATITIS: Primary | ICD-10-CM

## 2018-04-26 DIAGNOSIS — L81.9 DYSCHROMIA: ICD-10-CM

## 2018-04-26 PROCEDURE — 99212 OFFICE O/P EST SF 10 MIN: CPT | Performed by: DERMATOLOGY

## 2018-04-26 PROCEDURE — 99213 OFFICE O/P EST LOW 20 MIN: CPT | Performed by: DERMATOLOGY

## 2018-04-26 RX ORDER — HYDROQUINONE 40 MG/G
1 CREAM TOPICAL 2 TIMES DAILY
Qty: 30 G | Refills: 1 | Status: SHIPPED | OUTPATIENT
Start: 2018-04-26 | End: 2018-05-26

## 2018-05-07 NOTE — PROGRESS NOTES
Gilbert España is a 75year old female. Patient presents with:  Derm Problem: LOV: 1-31-18.  Pt presenting today for f/u with hyperpigmentation to face new patch pt notes improv with chin while using triamcinolone acetonide cream.             Lupe Spouse name: N/A    Years of education: N/A  Number of children: N/A     Occupational History  None on file     Social History Main Topics   Smoking status: Never Smoker    Smokeless tobacco: Never Used    Alcohol use No    Drug use: No    Sexual activity: possibly postinflammatory if area worsens at lateral oral commissures add consider over-the-counter clotrimazole, Neosporin along with the triamcinolone to the lateral oral commissures. 2 new areas of eczema on the cheeks we used 2.5% hydrocortisone.   May noted above

## 2018-07-17 ENCOUNTER — OFFICE VISIT (OUTPATIENT)
Dept: FAMILY MEDICINE CLINIC | Facility: CLINIC | Age: 68
End: 2018-07-17

## 2018-07-17 ENCOUNTER — NURSE TRIAGE (OUTPATIENT)
Dept: OTHER | Age: 68
End: 2018-07-17

## 2018-07-17 VITALS
WEIGHT: 152 LBS | BODY MASS INDEX: 29.84 KG/M2 | DIASTOLIC BLOOD PRESSURE: 79 MMHG | SYSTOLIC BLOOD PRESSURE: 147 MMHG | HEART RATE: 82 BPM | HEIGHT: 60 IN | TEMPERATURE: 99 F

## 2018-07-17 DIAGNOSIS — E78.00 HYPERCHOLESTEREMIA: ICD-10-CM

## 2018-07-17 DIAGNOSIS — R30.0 DYSURIA: Primary | ICD-10-CM

## 2018-07-17 DIAGNOSIS — R31.29 OTHER MICROSCOPIC HEMATURIA: ICD-10-CM

## 2018-07-17 DIAGNOSIS — L81.8 POSTINFLAMMATORY HYPOPIGMENTATION: ICD-10-CM

## 2018-07-17 DIAGNOSIS — R10.2 SUPRAPUBIC PAIN: ICD-10-CM

## 2018-07-17 LAB
APPEARANCE: CLEAR
BILIRUB UR QL: NEGATIVE
BILIRUBIN: NEGATIVE
CLARITY UR: CLEAR
COLOR UR: YELLOW
GLUCOSE (URINE DIPSTICK): NEGATIVE MG/DL
GLUCOSE UR-MCNC: NEGATIVE MG/DL
KETONES (URINE DIPSTICK): NEGATIVE MG/DL
KETONES UR-MCNC: NEGATIVE MG/DL
LEUKOCYTE ESTERASE UR QL STRIP.AUTO: NEGATIVE
LEUKOCYTES: NEGATIVE
MULTISTIX LOT#: NORMAL NUMERIC
NITRITE UR QL STRIP.AUTO: NEGATIVE
NITRITE, URINE: NEGATIVE
PH UR: 7 [PH] (ref 5–8)
PH, URINE: 6.5 (ref 4.5–8)
PROT UR-MCNC: NEGATIVE MG/DL
PROTEIN (URINE DIPSTICK): NEGATIVE MG/DL
RBC #/AREA URNS AUTO: 0 /HPF
SP GR UR STRIP: 1 (ref 1–1.03)
SPECIFIC GRAVITY: 1.01 (ref 1–1.03)
URINE-COLOR: YELLOW
UROBILINOGEN UR STRIP-ACNC: <2
UROBILINOGEN,SEMI-QN: 0.2 MG/DL (ref 0–1.9)
VIT C UR-MCNC: NEGATIVE MG/DL
WBC #/AREA URNS AUTO: <1 /HPF

## 2018-07-17 PROCEDURE — 99213 OFFICE O/P EST LOW 20 MIN: CPT | Performed by: FAMILY MEDICINE

## 2018-07-17 PROCEDURE — 81003 URINALYSIS AUTO W/O SCOPE: CPT | Performed by: FAMILY MEDICINE

## 2018-07-17 PROCEDURE — 99212 OFFICE O/P EST SF 10 MIN: CPT | Performed by: FAMILY MEDICINE

## 2018-07-17 RX ORDER — OMEPRAZOLE 20 MG/1
CAPSULE, DELAYED RELEASE ORAL
Qty: 30 CAPSULE | Refills: 2 | Status: SHIPPED | OUTPATIENT
Start: 2018-07-17 | End: 2018-09-26

## 2018-07-17 RX ORDER — NITROFURANTOIN 25; 75 MG/1; MG/1
100 CAPSULE ORAL 2 TIMES DAILY
Qty: 10 CAPSULE | Refills: 0 | Status: SHIPPED | OUTPATIENT
Start: 2018-07-17 | End: 2018-07-20

## 2018-07-17 NOTE — PROGRESS NOTES
Sandy Menendez is a 70year old female. HPI:   Patient presents with symptoms of UTI. Complaining of urinary frequency,dysuria, suprapubic pain, . Denies back pain, fever, hematuria.   Symptoms x 5 days      Current Outpatient Prescriptions:  hydrocortison

## 2018-07-19 ENCOUNTER — NURSE TRIAGE (OUTPATIENT)
Dept: OTHER | Age: 68
End: 2018-07-19

## 2018-07-20 ENCOUNTER — APPOINTMENT (OUTPATIENT)
Dept: LAB | Age: 68
End: 2018-07-20
Attending: FAMILY MEDICINE
Payer: COMMERCIAL

## 2018-07-20 RX ORDER — SULFAMETHOXAZOLE AND TRIMETHOPRIM 800; 160 MG/1; MG/1
1 TABLET ORAL 2 TIMES DAILY
Qty: 14 TABLET | Refills: 0 | Status: SHIPPED | OUTPATIENT
Start: 2018-07-20 | End: 2018-07-27

## 2018-07-20 NOTE — TELEPHONE ENCOUNTER
Daughter-in-law calling back regarding message below. Informed AMA out of office when message was routed last night and is out of office today. Verifies pt no longer having chest pain and feels fine today, since stopping Macrobid.      Informed request for

## 2018-07-20 NOTE — TELEPHONE ENCOUNTER
Called patient's daughter-in-law - verified patient's name and  - informed patient's daughter-in-law of doctor's note - daughter-in-law verbalized understanding

## 2018-07-20 NOTE — TELEPHONE ENCOUNTER
Rubina Amin: please advise for Michelle: daughter in law called again. She forgot to ask about the urine results done 7/17/18. She states she wasn't given the results, and wants to make sure patient should start new ABX Bactrim that was sent this morning.      Ple

## 2018-07-20 NOTE — TELEPHONE ENCOUNTER
Urine c/s not done-but ua showed bacteria and blood which is often assoc with infection.   Advised to f/u if s/s do not improve with new anbx

## 2018-07-20 NOTE — TELEPHONE ENCOUNTER
Action Requested: Summary for Provider     []  Critical Lab, Recommendations Needed  [] Need Additional Advice  []   FYI    []   Need Orders  [] Need Medications Sent to Pharmacy  []  Other     SUMMARY: Daughter-in-law reports patient is having an allergic

## 2018-07-23 ENCOUNTER — APPOINTMENT (OUTPATIENT)
Dept: LAB | Facility: HOSPITAL | Age: 68
End: 2018-07-23
Attending: FAMILY MEDICINE
Payer: COMMERCIAL

## 2018-07-23 DIAGNOSIS — E78.00 HYPERCHOLESTEREMIA: ICD-10-CM

## 2018-07-23 DIAGNOSIS — L81.8 POSTINFLAMMATORY HYPOPIGMENTATION: ICD-10-CM

## 2018-07-23 LAB
ALBUMIN SERPL BCP-MCNC: 3.9 G/DL (ref 3.5–4.8)
ALBUMIN/GLOB SERPL: 1.2 {RATIO} (ref 1–2)
ALP SERPL-CCNC: 79 U/L (ref 32–100)
ALT SERPL-CCNC: 17 U/L (ref 14–54)
ANION GAP SERPL CALC-SCNC: 6 MMOL/L (ref 0–18)
AST SERPL-CCNC: 19 U/L (ref 15–41)
BILIRUB SERPL-MCNC: 0.7 MG/DL (ref 0.3–1.2)
BUN SERPL-MCNC: 8 MG/DL (ref 8–20)
BUN/CREAT SERPL: 11.3 (ref 10–20)
CALCIUM SERPL-MCNC: 9.5 MG/DL (ref 8.5–10.5)
CHLORIDE SERPL-SCNC: 101 MMOL/L (ref 95–110)
CHOLEST SERPL-MCNC: 233 MG/DL (ref 110–200)
CO2 SERPL-SCNC: 28 MMOL/L (ref 22–32)
CREAT SERPL-MCNC: 0.71 MG/DL (ref 0.5–1.5)
GLOBULIN PLAS-MCNC: 3.2 G/DL (ref 2.5–3.7)
GLUCOSE SERPL-MCNC: 105 MG/DL (ref 70–99)
HDLC SERPL-MCNC: 46 MG/DL
LDLC SERPL CALC-MCNC: 158 MG/DL (ref 0–99)
NONHDLC SERPL-MCNC: 187 MG/DL
OSMOLALITY UR CALC.SUM OF ELEC: 279 MOSM/KG (ref 275–295)
PATIENT FASTING: YES
POTASSIUM SERPL-SCNC: 4.2 MMOL/L (ref 3.3–5.1)
PROT SERPL-MCNC: 7.1 G/DL (ref 5.9–8.4)
SODIUM SERPL-SCNC: 135 MMOL/L (ref 136–144)
TRIGL SERPL-MCNC: 145 MG/DL (ref 1–149)
TSH SERPL-ACNC: 1.88 UIU/ML (ref 0.45–5.33)

## 2018-07-23 PROCEDURE — 80061 LIPID PANEL: CPT

## 2018-07-23 PROCEDURE — 80053 COMPREHEN METABOLIC PANEL: CPT

## 2018-07-23 PROCEDURE — 36415 COLL VENOUS BLD VENIPUNCTURE: CPT

## 2018-07-23 PROCEDURE — 84443 ASSAY THYROID STIM HORMONE: CPT

## 2018-07-28 ENCOUNTER — TELEPHONE (OUTPATIENT)
Dept: FAMILY MEDICINE CLINIC | Facility: CLINIC | Age: 68
End: 2018-07-28

## 2018-07-28 NOTE — TELEPHONE ENCOUNTER
Patient's daughter in law Jonathan Bravo) (verbal consent)  Advised of result notes from Dr PERES, verbalized understanding and agreed with plan to follow up for cholesterol.

## 2018-07-28 NOTE — TELEPHONE ENCOUNTER
Patient's daughter in law (verbal release) was asking if urinalysis ever confirmed an infection, if culture was done? Reviewed notes from [employer] below.    Daughter in law reported that the Bactrim that was provided later also gave patient an allergic r

## 2018-07-28 NOTE — TELEPHONE ENCOUNTER
Message noted and labs from 7/23 reviewed and some elevation of cholesterol but otherwise unremarkable. Can release results and can wait for Dr Kiana Correia to address.

## 2018-07-28 NOTE — TELEPHONE ENCOUNTER
Pt's daughter-in-law called in to request results from 7/23 lab work. She was advised that labs were not reviewed yet and she expressed understanding. Also notified that we would need Pt approval to release information to her.  Pt stated that Pt was next to

## 2018-07-31 ENCOUNTER — APPOINTMENT (OUTPATIENT)
Dept: CT IMAGING | Facility: HOSPITAL | Age: 68
End: 2018-07-31
Attending: NURSE PRACTITIONER
Payer: COMMERCIAL

## 2018-07-31 ENCOUNTER — HOSPITAL ENCOUNTER (EMERGENCY)
Facility: HOSPITAL | Age: 68
Discharge: HOME | End: 2018-07-31
Payer: COMMERCIAL

## 2018-07-31 VITALS
OXYGEN SATURATION: 95 % | DIASTOLIC BLOOD PRESSURE: 76 MMHG | TEMPERATURE: 98 F | RESPIRATION RATE: 18 BRPM | HEART RATE: 70 BPM | HEIGHT: 62 IN | WEIGHT: 152 LBS | BODY MASS INDEX: 27.97 KG/M2 | SYSTOLIC BLOOD PRESSURE: 135 MMHG

## 2018-07-31 DIAGNOSIS — R10.30 LOWER ABDOMINAL PAIN: Primary | ICD-10-CM

## 2018-07-31 LAB
ANION GAP SERPL CALC-SCNC: 9 MMOL/L (ref 0–18)
BACTERIA UR QL AUTO: NEGATIVE /HPF
BASOPHILS # BLD: 0.1 K/UL (ref 0–0.2)
BASOPHILS NFR BLD: 1 %
BILIRUB UR QL: NEGATIVE
BUN SERPL-MCNC: 9 MG/DL (ref 8–20)
BUN/CREAT SERPL: 13.6 (ref 10–20)
CALCIUM SERPL-MCNC: 9.8 MG/DL (ref 8.5–10.5)
CHLORIDE SERPL-SCNC: 101 MMOL/L (ref 95–110)
CLARITY UR: CLEAR
CO2 SERPL-SCNC: 25 MMOL/L (ref 22–32)
COLOR UR: YELLOW
CREAT SERPL-MCNC: 0.66 MG/DL (ref 0.5–1.5)
EOSINOPHIL # BLD: 0.1 K/UL (ref 0–0.7)
EOSINOPHIL NFR BLD: 1 %
ERYTHROCYTE [DISTWIDTH] IN BLOOD BY AUTOMATED COUNT: 14 % (ref 11–15)
GLUCOSE SERPL-MCNC: 96 MG/DL (ref 70–99)
GLUCOSE UR-MCNC: NEGATIVE MG/DL
HCT VFR BLD AUTO: 41.9 % (ref 35–48)
HGB BLD-MCNC: 14.4 G/DL (ref 12–16)
KETONES UR-MCNC: NEGATIVE MG/DL
LEUKOCYTE ESTERASE UR QL STRIP.AUTO: NEGATIVE
LYMPHOCYTES # BLD: 1.7 K/UL (ref 1–4)
LYMPHOCYTES NFR BLD: 24 %
MCH RBC QN AUTO: 28.9 PG (ref 27–32)
MCHC RBC AUTO-ENTMCNC: 34.5 G/DL (ref 32–37)
MCV RBC AUTO: 83.9 FL (ref 80–100)
MONOCYTES # BLD: 0.5 K/UL (ref 0–1)
MONOCYTES NFR BLD: 7 %
NEUTROPHILS # BLD AUTO: 4.8 K/UL (ref 1.8–7.7)
NEUTROPHILS NFR BLD: 67 %
NITRITE UR QL STRIP.AUTO: NEGATIVE
OSMOLALITY UR CALC.SUM OF ELEC: 279 MOSM/KG (ref 275–295)
PH UR: 6 [PH] (ref 5–8)
PLATELET # BLD AUTO: 233 K/UL (ref 140–400)
PMV BLD AUTO: 7.7 FL (ref 7.4–10.3)
POTASSIUM SERPL-SCNC: 4.1 MMOL/L (ref 3.3–5.1)
PROT UR-MCNC: NEGATIVE MG/DL
RBC # BLD AUTO: 4.99 M/UL (ref 3.7–5.4)
RBC #/AREA URNS AUTO: 2 /HPF
SODIUM SERPL-SCNC: 135 MMOL/L (ref 136–144)
SP GR UR STRIP: 1.01 (ref 1–1.03)
UROBILINOGEN UR STRIP-ACNC: <2
VIT C UR-MCNC: NEGATIVE MG/DL
WBC # BLD AUTO: 7.2 K/UL (ref 4–11)
WBC #/AREA URNS AUTO: <1 /HPF

## 2018-07-31 PROCEDURE — 81001 URINALYSIS AUTO W/SCOPE: CPT

## 2018-07-31 PROCEDURE — 96360 HYDRATION IV INFUSION INIT: CPT

## 2018-07-31 PROCEDURE — 80048 BASIC METABOLIC PNL TOTAL CA: CPT | Performed by: NURSE PRACTITIONER

## 2018-07-31 PROCEDURE — 99285 EMERGENCY DEPT VISIT HI MDM: CPT

## 2018-07-31 PROCEDURE — 74177 CT ABD & PELVIS W/CONTRAST: CPT | Performed by: NURSE PRACTITIONER

## 2018-07-31 PROCEDURE — 85025 COMPLETE CBC W/AUTO DIFF WBC: CPT | Performed by: NURSE PRACTITIONER

## 2018-07-31 NOTE — ED NOTES
Care assumed from triage. Patient presents with c/o intermittent suprapubic pain that worsens after urination and bowel movements. Also having pain in L flank.  Pt reporting she saw her PCP last week and was given antibiotics for a UTI, states the doctor ca

## 2018-07-31 NOTE — ED PROVIDER NOTES
Patient Seen in: [facility]    History   CC: abd pain  HPI: Brandt [de-identified] 70year old female w/ hx MARIANA and hyperlipidemia who presents to the ER c/o suprapubic abd pain, urinary frequency, and midline lower back pain intermit src: Oral  SpO2: 100 %  O2 Device: None (Room air)    Current:/73   Pulse 78   Temp 97.8 °F (36.6 °C) (Oral)   Resp 16   Ht 157.5 cm (5' 2\")   Wt 68.9 kg   SpO2 94%   BMI 27.80 kg/m²         General - Appears well, non-toxic and in NAD  Head - Appea right ovarian cyst. Followup nonemergent ultrasound recommended. 4. Small hiatal hernia. 5. Calcified mesenteric lymph nodes compatible with prior granulomatous disease. 6. Mild atherosclerotic calcification without aneurysm. 7. A mildly complicated 1. BLADDER: Distended. ASCITES:                         None. PELVIC ORGANS: A 3.3 cm cyst in the right ovary. BONES: Sclerotic focus in the S3 segment of the sacrum is likely a bone island. Degenerative changes in the spine.   LUNG BASES: Normal.  No visib

## 2018-07-31 NOTE — ED INITIAL ASSESSMENT (HPI)
Supra pubic pain // L flank pain x2 weeks - was started on Nitrofurantoin for UTI on 07/17 but then discontinued 2 days later due to allergic reaction. Denies vomiting or fever.

## 2018-09-10 ENCOUNTER — NURSE TRIAGE (OUTPATIENT)
Dept: OTHER | Age: 68
End: 2018-09-10

## 2018-09-10 ENCOUNTER — APPOINTMENT (OUTPATIENT)
Dept: LAB | Age: 68
End: 2018-09-10
Attending: INTERNAL MEDICINE
Payer: COMMERCIAL

## 2018-09-10 ENCOUNTER — OFFICE VISIT (OUTPATIENT)
Dept: INTERNAL MEDICINE CLINIC | Facility: CLINIC | Age: 68
End: 2018-09-10
Payer: COMMERCIAL

## 2018-09-10 VITALS
WEIGHT: 151 LBS | BODY MASS INDEX: 29.64 KG/M2 | SYSTOLIC BLOOD PRESSURE: 147 MMHG | HEART RATE: 76 BPM | TEMPERATURE: 98 F | HEIGHT: 60 IN | DIASTOLIC BLOOD PRESSURE: 84 MMHG

## 2018-09-10 DIAGNOSIS — R39.9 UTI SYMPTOMS: ICD-10-CM

## 2018-09-10 DIAGNOSIS — N83.201 CYST OF RIGHT OVARY: ICD-10-CM

## 2018-09-10 DIAGNOSIS — E78.00 HYPERCHOLESTEREMIA: ICD-10-CM

## 2018-09-10 DIAGNOSIS — N28.1 CYST OF RIGHT KIDNEY: ICD-10-CM

## 2018-09-10 DIAGNOSIS — R39.9 UTI SYMPTOMS: Primary | ICD-10-CM

## 2018-09-10 LAB
ALBUMIN SERPL BCP-MCNC: 3.8 G/DL (ref 3.5–4.8)
ALBUMIN/GLOB SERPL: 1.2 {RATIO} (ref 1–2)
ALP SERPL-CCNC: 77 U/L (ref 32–100)
ALT SERPL-CCNC: 16 U/L (ref 14–54)
ANION GAP SERPL CALC-SCNC: 7 MMOL/L (ref 0–18)
APPEARANCE: CLEAR
AST SERPL-CCNC: 22 U/L (ref 15–41)
BACTERIA UR QL AUTO: NEGATIVE /HPF
BILIRUB SERPL-MCNC: 0.5 MG/DL (ref 0.3–1.2)
BILIRUBIN: NEGATIVE
BUN SERPL-MCNC: 10 MG/DL (ref 8–20)
BUN/CREAT SERPL: 14.7 (ref 10–20)
CALCIUM SERPL-MCNC: 9.5 MG/DL (ref 8.5–10.5)
CHLORIDE SERPL-SCNC: 100 MMOL/L (ref 95–110)
CHOLEST SERPL-MCNC: 275 MG/DL (ref 110–200)
CO2 SERPL-SCNC: 27 MMOL/L (ref 22–32)
CREAT SERPL-MCNC: 0.68 MG/DL (ref 0.5–1.5)
ERYTHROCYTE [DISTWIDTH] IN BLOOD BY AUTOMATED COUNT: 14 % (ref 11–15)
GLOBULIN PLAS-MCNC: 3.1 G/DL (ref 2.5–3.7)
GLUCOSE (URINE DIPSTICK): NEGATIVE MG/DL
GLUCOSE SERPL-MCNC: 96 MG/DL (ref 70–99)
HCT VFR BLD AUTO: 41 % (ref 35–48)
HDLC SERPL-MCNC: 47 MG/DL
HGB BLD-MCNC: 13.7 G/DL (ref 12–16)
KETONES (URINE DIPSTICK): NEGATIVE MG/DL
LDLC SERPL CALC-MCNC: 184 MG/DL (ref 0–99)
LEUKOCYTES: NEGATIVE
MCH RBC QN AUTO: 28.5 PG (ref 27–32)
MCHC RBC AUTO-ENTMCNC: 33.3 G/DL (ref 32–37)
MCV RBC AUTO: 85.5 FL (ref 80–100)
MULTISTIX LOT#: NORMAL NUMERIC
NITRITE, URINE: NEGATIVE
NONHDLC SERPL-MCNC: 228 MG/DL
OSMOLALITY UR CALC.SUM OF ELEC: 277 MOSM/KG (ref 275–295)
PATIENT FASTING: YES
PH, URINE: 7 (ref 4.5–8)
PLATELET # BLD AUTO: 221 K/UL (ref 140–400)
PMV BLD AUTO: 7.9 FL (ref 7.4–10.3)
POTASSIUM SERPL-SCNC: 4.1 MMOL/L (ref 3.3–5.1)
PROT SERPL-MCNC: 6.9 G/DL (ref 5.9–8.4)
PROTEIN (URINE DIPSTICK): NEGATIVE MG/DL
RBC # BLD AUTO: 4.8 M/UL (ref 3.7–5.4)
RBC #/AREA URNS AUTO: 1 /HPF
SODIUM SERPL-SCNC: 134 MMOL/L (ref 136–144)
SPECIFIC GRAVITY: 1.01 (ref 1–1.03)
TRIGL SERPL-MCNC: 222 MG/DL (ref 1–149)
URINE-COLOR: YELLOW
UROBILINOGEN,SEMI-QN: 0.2 MG/DL (ref 0–1.9)
WBC # BLD AUTO: 6.5 K/UL (ref 4–11)
WBC #/AREA URNS AUTO: <1 /HPF

## 2018-09-10 PROCEDURE — 80053 COMPREHEN METABOLIC PANEL: CPT

## 2018-09-10 PROCEDURE — 81015 MICROSCOPIC EXAM OF URINE: CPT

## 2018-09-10 PROCEDURE — 80061 LIPID PANEL: CPT

## 2018-09-10 PROCEDURE — 85027 COMPLETE CBC AUTOMATED: CPT

## 2018-09-10 PROCEDURE — 36415 COLL VENOUS BLD VENIPUNCTURE: CPT

## 2018-09-10 PROCEDURE — 99214 OFFICE O/P EST MOD 30 MIN: CPT | Performed by: INTERNAL MEDICINE

## 2018-09-10 PROCEDURE — 99212 OFFICE O/P EST SF 10 MIN: CPT | Performed by: INTERNAL MEDICINE

## 2018-09-10 PROCEDURE — 81002 URINALYSIS NONAUTO W/O SCOPE: CPT | Performed by: INTERNAL MEDICINE

## 2018-09-10 NOTE — TELEPHONE ENCOUNTER
Action Requested: Summary for Provider     []  Critical Lab, Recommendations Needed  [] Need Additional Advice  [x]   FYI    []   Need Orders  [] Need Medications Sent to Pharmacy  []  Other     SUMMARY: urinary frequency/urgency, pain/burning when urinati

## 2018-09-10 NOTE — PROGRESS NOTES
HPI:    Patient ID: Joycie Bloch is a 70year old female.     HPI    Vaginal buring and pelvic pain  Recurrent  Was seen by midwife lidocaine external vaginal given per pt not helping  She was givne estradiol vaginal  Per pt did not help  Was in the ER  R • Esophageal reflux    • High cholesterol    • Hyperlipidemia       Past Surgical History:  No date:       Comment:  1  No date:       Comment:  1   Family History   Problem Relation Age of Onset   • Cataracts Mother    • Lipids Sister    • INTERNAL  US PELVIS (TRANSVAGINAL PELVIS) (CPT=76830)  US KIDNEY/BLADDER (WAT=26935)        XB#[id number]

## 2018-09-13 ENCOUNTER — HOSPITAL ENCOUNTER (OUTPATIENT)
Dept: ULTRASOUND IMAGING | Age: 68
Discharge: HOME | End: 2018-09-13
Attending: INTERNAL MEDICINE
Payer: COMMERCIAL

## 2018-09-13 DIAGNOSIS — N28.1 CYST OF RIGHT KIDNEY: ICD-10-CM

## 2018-09-13 DIAGNOSIS — N28.1 CYST OF RIGHT KIDNEY: Primary | ICD-10-CM

## 2018-09-13 PROCEDURE — 76856 US EXAM PELVIC COMPLETE: CPT | Performed by: INTERNAL MEDICINE

## 2018-09-13 PROCEDURE — 76770 US EXAM ABDO BACK WALL COMP: CPT | Performed by: INTERNAL MEDICINE

## 2018-09-25 ENCOUNTER — OFFICE VISIT (OUTPATIENT)
Dept: FAMILY MEDICINE CLINIC | Facility: CLINIC | Age: 68
End: 2018-09-25
Payer: COMMERCIAL

## 2018-09-25 VITALS
WEIGHT: 150 LBS | DIASTOLIC BLOOD PRESSURE: 86 MMHG | TEMPERATURE: 99 F | HEART RATE: 74 BPM | SYSTOLIC BLOOD PRESSURE: 131 MMHG | BODY MASS INDEX: 29.45 KG/M2 | HEIGHT: 60 IN

## 2018-09-25 DIAGNOSIS — N83.201 RIGHT OVARIAN CYST: ICD-10-CM

## 2018-09-25 DIAGNOSIS — E78.00 HYPERCHOLESTEREMIA: Primary | ICD-10-CM

## 2018-09-25 DIAGNOSIS — Z23 NEED FOR VACCINATION: ICD-10-CM

## 2018-09-25 PROCEDURE — 90653 IIV ADJUVANT VACCINE IM: CPT | Performed by: FAMILY MEDICINE

## 2018-09-25 PROCEDURE — 99212 OFFICE O/P EST SF 10 MIN: CPT | Performed by: FAMILY MEDICINE

## 2018-09-25 PROCEDURE — 99214 OFFICE O/P EST MOD 30 MIN: CPT | Performed by: FAMILY MEDICINE

## 2018-09-25 PROCEDURE — 90471 IMMUNIZATION ADMIN: CPT | Performed by: FAMILY MEDICINE

## 2018-09-25 RX ORDER — PHENAZOPYRIDINE HYDROCHLORIDE 200 MG/1
TABLET, FILM COATED ORAL
COMMUNITY
Start: 2018-04-15 | End: 2018-11-06 | Stop reason: ALTCHOICE

## 2018-09-25 RX ORDER — ROSUVASTATIN CALCIUM 10 MG/1
10 TABLET, COATED ORAL NIGHTLY
Qty: 90 TABLET | Refills: 0 | Status: SHIPPED | OUTPATIENT
Start: 2018-09-25 | End: 2018-11-06 | Stop reason: SINTOL

## 2018-09-25 NOTE — PROGRESS NOTES
HPI:    Patient ID: Cici Donis is a 70year old female. HPI     Patient here for follow up cholesterol and labs  She saw her labs and restarted crestor 10mg 2 weeks ago  Had stopped it due to some shoulder pain in past.  No pain at this time.     Vag Skin: Skin is warm and dry. No rash noted. ASSESSMENT/PLAN:   Hypercholesteremia  (primary encounter diagnosis)  Right ovarian cyst  Need for vaccination    1.  Hypercholesteremia  Continue crestor  Recheck labs 3 months and fup  - LIPID PANE

## 2018-09-26 RX ORDER — OMEPRAZOLE 20 MG/1
CAPSULE, DELAYED RELEASE ORAL
Qty: 90 CAPSULE | Refills: 0 | Status: SHIPPED | OUTPATIENT
Start: 2018-09-26 | End: 2018-11-06 | Stop reason: DRUGHIGH

## 2018-11-01 ENCOUNTER — TELEPHONE (OUTPATIENT)
Dept: OTHER | Age: 68
End: 2018-11-01

## 2018-11-01 NOTE — TELEPHONE ENCOUNTER
Have her try miralax  Increase water intake to 64 oz a day  If no better, have her follow up next week  Can add prunes as well 1-2 a day

## 2018-11-01 NOTE — TELEPHONE ENCOUNTER
Pt's daughter states pt is having irregular bowel movements and burning sensation after having bowel movement. Pt sometimes has constipation. Symptoms started a few days. Daughter states pt wants to schedule appt with Dr. Vicki George only.   No appt available, ple

## 2018-11-05 NOTE — TELEPHONE ENCOUNTER
Dr Dang Murray, please advise. Patient already scheduled to see Mila Vega 11/6/18 tomorrow at 10:30am.     Patient tried the Miralax daily, has not been drinking 64 fl oz of water daily and has not eaten 1-2 prunes daily.  She states that she has a sharp stomac

## 2018-11-06 ENCOUNTER — OFFICE VISIT (OUTPATIENT)
Dept: FAMILY MEDICINE CLINIC | Facility: CLINIC | Age: 68
End: 2018-11-06
Payer: COMMERCIAL

## 2018-11-06 VITALS
DIASTOLIC BLOOD PRESSURE: 84 MMHG | BODY MASS INDEX: 29.06 KG/M2 | HEIGHT: 60 IN | TEMPERATURE: 99 F | SYSTOLIC BLOOD PRESSURE: 155 MMHG | WEIGHT: 148 LBS | HEART RATE: 74 BPM | RESPIRATION RATE: 16 BRPM

## 2018-11-06 DIAGNOSIS — R10.13 EPIGASTRIC PAIN: ICD-10-CM

## 2018-11-06 DIAGNOSIS — R30.0 DYSURIA: ICD-10-CM

## 2018-11-06 DIAGNOSIS — E78.00 HYPERCHOLESTEREMIA: Primary | ICD-10-CM

## 2018-11-06 PROBLEM — N39.0 URINARY TRACT INFECTION: Status: RESOLVED | Noted: 2017-02-20 | Resolved: 2018-11-06

## 2018-11-06 PROBLEM — R21 FACIAL RASH: Status: RESOLVED | Noted: 2017-09-26 | Resolved: 2018-11-06

## 2018-11-06 PROCEDURE — 99214 OFFICE O/P EST MOD 30 MIN: CPT | Performed by: NURSE PRACTITIONER

## 2018-11-06 PROCEDURE — 81003 URINALYSIS AUTO W/O SCOPE: CPT | Performed by: NURSE PRACTITIONER

## 2018-11-06 RX ORDER — ATORVASTATIN CALCIUM 10 MG/1
10 TABLET, FILM COATED ORAL NIGHTLY
Qty: 30 TABLET | Refills: 3 | Status: SHIPPED | OUTPATIENT
Start: 2018-11-06 | End: 2019-04-09

## 2018-11-06 RX ORDER — OMEPRAZOLE 40 MG/1
40 CAPSULE, DELAYED RELEASE ORAL DAILY
Qty: 30 CAPSULE | Refills: 1 | Status: SHIPPED | OUTPATIENT
Start: 2018-11-06 | End: 2018-12-06

## 2018-11-06 NOTE — PROGRESS NOTES
HPI    Pt presents with epigastric pressure for past 10 days; does not radiate. Has some nausea, denies vomitting. Starts a couple hours after eating. Denies diarrhea. Has intermittant burning with urination, rc at night. Has had decreased appetite. Date   • Arthritis    • Esophageal reflux    • High cholesterol    • Hyperlipidemia        .   Past Surgical History:   Procedure Laterality Date   •       1   •       1       Family History   Problem Relation Age of Onset   • Cataracts Mother well-nourished. No distress. HENT:   Head: Normocephalic and atraumatic.    Right Ear: Tympanic membrane and ear canal normal. No cerumen present  Left Ear: Tympanic membrane and ear canal normal. No cerumen present  Eyes: Conjunctivae and EOM are normal. up for My Chart if not already registered.

## 2018-11-06 NOTE — ASSESSMENT & PLAN NOTE
Stop crestor  Wait one week to see if abd pain improves without crestor  If abd pain improves, start lipitor 10 mg at hs  Low fat, low chol diet

## 2018-11-06 NOTE — PATIENT INSTRUCTIONS
Abdominal Pain    Abdominal pain is pain in the stomach or belly area. Everyone has this pain from time to time. In many cases it goes away on its own. But abdominal pain can sometimes be due to a serious problem, such as appendicitis.  So it’s important If you have vomiting or diarrhea, sip water or other clear fluids. When you are ready to eat solid foods again, start with small amounts of easy-to-digest, low-fat foods. These include apple sauce, toast, or crackers.    When to seek medical care  Call [phone]

## 2018-11-13 ENCOUNTER — TELEPHONE (OUTPATIENT)
Dept: GASTROENTEROLOGY | Facility: CLINIC | Age: 68
End: 2018-11-13

## 2018-11-13 ENCOUNTER — OFFICE VISIT (OUTPATIENT)
Dept: GASTROENTEROLOGY | Facility: CLINIC | Age: 68
End: 2018-11-13
Payer: COMMERCIAL

## 2018-11-13 VITALS
HEART RATE: 79 BPM | HEIGHT: 62 IN | WEIGHT: 147.81 LBS | DIASTOLIC BLOOD PRESSURE: 76 MMHG | SYSTOLIC BLOOD PRESSURE: 124 MMHG | BODY MASS INDEX: 27.2 KG/M2

## 2018-11-13 DIAGNOSIS — R12 HEARTBURN: ICD-10-CM

## 2018-11-13 DIAGNOSIS — R12 HEART BURN: ICD-10-CM

## 2018-11-13 DIAGNOSIS — K59.00 CONSTIPATION, UNSPECIFIED CONSTIPATION TYPE: ICD-10-CM

## 2018-11-13 DIAGNOSIS — Z12.11 COLON CANCER SCREENING: Primary | ICD-10-CM

## 2018-11-13 DIAGNOSIS — Z12.11 ENCOUNTER FOR SCREENING COLONOSCOPY: Primary | ICD-10-CM

## 2018-11-13 DIAGNOSIS — R10.13 EPIGASTRIC PAIN: ICD-10-CM

## 2018-11-13 PROCEDURE — 99212 OFFICE O/P EST SF 10 MIN: CPT | Performed by: PHYSICIAN ASSISTANT

## 2018-11-13 PROCEDURE — 99244 OFF/OP CNSLTJ NEW/EST MOD 40: CPT | Performed by: PHYSICIAN ASSISTANT

## 2018-11-13 RX ORDER — POLYETHYLENE GLYCOL 3350, SODIUM CHLORIDE, SODIUM BICARBONATE, POTASSIUM CHLORIDE 420; 11.2; 5.72; 1.48 G/4L; G/4L; G/4L; G/4L
POWDER, FOR SOLUTION ORAL
Qty: 1 BOTTLE | Refills: 0 | Status: SHIPPED | OUTPATIENT
Start: 2018-11-13 | End: 2019-02-13 | Stop reason: ALTCHOICE

## 2018-11-13 RX ORDER — ROSUVASTATIN CALCIUM 10 MG/1
10 TABLET, COATED ORAL
COMMUNITY
Start: 2018-09-25 | End: 2019-02-13

## 2018-11-13 NOTE — H&P
[address] [facility] [address] Gastroenterology                                                                                                  Clinic History and Physical     Pa Allergies, ROS:      Past Medical History:   Diagnosis Date   • Arthritis    • Esophageal reflux    • High cholesterol    • Hyperlipidemia       Past Surgical History:   Procedure Laterality Date   •       1   •       1      Family Hx:   Famil joint effusion/severe erythema  BEHAVIOR/PSYCH:  negative for psychotic behavior    PHYSICAL EXAM:   Blood pressure 124/76, pulse 79, height 5' 2\" (1.575 m), weight 147 lb 12.8 oz (67 kg), not currently breastfeeding.     Gen: patient appears comfortable a plenty of water and addition of fiber. Bowel regimen discussed with patient - MiraLAX 17 g as needed in a glass of water. C-scope recommended. # Heartburn/Epigastric Discomfort: Controlled heartburn with PPI - however (+) epigastric discomfort.  Recomme

## 2018-11-13 NOTE — TELEPHONE ENCOUNTER
Scheduled for:  Colonoscopy [address]  Provider Name: Dr Renetta Carr  Date:  Wed 12/05/18   Location:  [address] [place] [address] 1  Sedation:  MAC  Time:  9:00 am  Prep: split dose trilyte  Meds/Allergies Reconciled?:  Bactrim, macrobid, PCN,pravastatin  Diagnosis with codes:  Con

## 2018-11-13 NOTE — PATIENT INSTRUCTIONS
1. Schedule colonoscopy and upper endoscopy with Dr. Tamiko Domínguez with MAC sedation @ [facility] or [place].    2.  bowel prep from pharmacy - I have prescribed Trilyte split dose preparation    3. Continue all medications for procedure    4.  Read all bowel prep instru

## 2018-11-13 NOTE — TELEPHONE ENCOUNTER
RN's    Pt is scheduled for a CLN/EGD @ [facility] and has [employer], please check for any PA

## 2018-11-15 NOTE — TELEPHONE ENCOUNTER
Spoke to [employer]. At [facility] and she states that no PA is required for this pt's procedures b/c they will be completed at an in network ambulatory surgery center [facility]). Confirmed the tax ID for [facility] is within [facility].  Call reference number: [id number]

## 2018-12-05 ENCOUNTER — ANESTHESIA (OUTPATIENT)
Dept: ENDOSCOPY | Age: 68
End: 2018-12-05
Payer: COMMERCIAL

## 2018-12-05 ENCOUNTER — HOSPITAL ENCOUNTER (OUTPATIENT)
Age: 68
Setting detail: HOSPITAL OUTPATIENT SURGERY
Discharge: HOME | End: 2018-12-05
Attending: INTERNAL MEDICINE | Admitting: INTERNAL MEDICINE
Payer: COMMERCIAL

## 2018-12-05 ENCOUNTER — ANESTHESIA EVENT (OUTPATIENT)
Dept: ENDOSCOPY | Age: 68
End: 2018-12-05
Payer: COMMERCIAL

## 2018-12-05 DIAGNOSIS — R12 HEART BURN: ICD-10-CM

## 2018-12-05 DIAGNOSIS — Z12.11 COLON CANCER SCREENING: ICD-10-CM

## 2018-12-05 DIAGNOSIS — K59.00 CONSTIPATION, UNSPECIFIED CONSTIPATION TYPE: ICD-10-CM

## 2018-12-05 PROBLEM — K31.9 GASTROPATHY: Status: ACTIVE | Noted: 2018-12-05

## 2018-12-05 PROBLEM — K57.30 DIVERTICULOSIS LARGE INTESTINE W/O PERFORATION OR ABSCESS W/O BLEEDING: Status: ACTIVE | Noted: 2018-12-05

## 2018-12-05 PROBLEM — K64.8 INTERNAL HEMORRHOIDS WITHOUT COMPLICATION: Status: ACTIVE | Noted: 2018-12-05

## 2018-12-05 PROCEDURE — 45378 DIAGNOSTIC COLONOSCOPY: CPT | Performed by: INTERNAL MEDICINE

## 2018-12-05 PROCEDURE — 99070 SPECIAL SUPPLIES PHYS/QHP: CPT | Performed by: INTERNAL MEDICINE

## 2018-12-05 PROCEDURE — 43239 EGD BIOPSY SINGLE/MULTIPLE: CPT | Performed by: INTERNAL MEDICINE

## 2018-12-05 PROCEDURE — 88312 SPECIAL STAINS GROUP 1: CPT | Performed by: INTERNAL MEDICINE

## 2018-12-05 PROCEDURE — 88305 TISSUE EXAM BY PATHOLOGIST: CPT | Performed by: INTERNAL MEDICINE

## 2018-12-05 RX ORDER — LIDOCAINE HYDROCHLORIDE 10 MG/ML
INJECTION, SOLUTION EPIDURAL; INFILTRATION; INTRACAUDAL; PERINEURAL AS NEEDED
Status: DISCONTINUED | OUTPATIENT
Start: 2018-12-05 | End: 2018-12-05 | Stop reason: SURG

## 2018-12-05 RX ORDER — SODIUM CHLORIDE, SODIUM LACTATE, POTASSIUM CHLORIDE, CALCIUM CHLORIDE 600; 310; 30; 20 MG/100ML; MG/100ML; MG/100ML; MG/100ML
INJECTION, SOLUTION INTRAVENOUS CONTINUOUS
Status: DISCONTINUED | OUTPATIENT
Start: 2018-12-05 | End: 2018-12-05

## 2018-12-05 RX ADMIN — SODIUM CHLORIDE, SODIUM LACTATE, POTASSIUM CHLORIDE, CALCIUM CHLORIDE: 600; 310; 30; 20 INJECTION, SOLUTION INTRAVENOUS at 10:02:00

## 2018-12-05 RX ADMIN — LIDOCAINE HYDROCHLORIDE 50 MG: 10 INJECTION, SOLUTION EPIDURAL; INFILTRATION; INTRACAUDAL; PERINEURAL at 09:21:00

## 2018-12-05 RX ADMIN — SODIUM CHLORIDE, SODIUM LACTATE, POTASSIUM CHLORIDE, CALCIUM CHLORIDE: 600; 310; 30; 20 INJECTION, SOLUTION INTRAVENOUS at 09:21:00

## 2018-12-05 NOTE — OPERATIVE REPORT
[facility]    PATIENT'S NAME: Rosy Garcia PHYSICIAN: Saadia Lane MD   OPERATING PHYSICIAN: Saadia Lane MD   PATIENT ACCOUNT#:   [id number]    LOCATION:  [address],[facility] 1 ENDO POOL ROOMS 4 [facility] gastric antrum, and biopsies x4 were taken of the gastric body. Retroflexion of the endoscope showed a normal cardia and GE junction. The pylorus was normal.  4.   The duodenum showed a normal bulb and sweep to the third portion.     IMPRESSION:  Moderate

## 2018-12-05 NOTE — OPERATIVE REPORT
[facility]    PATIENT'S NAME: Mikki Emmanuel PHYSICIAN: Zoraida Caldera MD   OPERATING PHYSICIAN: Zoraida Caldera MD   PATIENT ACCOUNT#:   [id number]    LOCATION:  [address],[facility] 1 ENDO POOL ROOMS 4 [facility] MD  d: 12/05/2018 09:40:01  t: 12/05/2018 09:42:53  [facility] [id number]  [facility]/

## 2018-12-05 NOTE — ANESTHESIA PREPROCEDURE EVALUATION
Anesthesia PreOp Note    HPI:     Tomy Black is a 70year old female who presents for preoperative consultation requested by: Susannah Brandon MD    Date of Surgery: 12/5/2018    Procedure(s):  COLONOSCOPY  ESOPHAGOGASTRODUODENOSCOPY (EGD) and evening. Disp: 15 g Rfl: 0 12/4/2018       Current Facility-Administered Medications Ordered in Epic:  lactated ringers infusion  Intravenous Continuous Bud Elizabeth MD     No current [facility]-ordered outpatient medications on file.       Fede Boogie 09/10/2018          Vital Signs: Body mass index is 26.52 kg/m². height is 1.575 m (5' 2\") and weight is 65.8 kg (145 lb).  Her blood pressure is 150/71 and her pulse is 87.    12/05/18  0839   BP: 150/71   BP Location: Left arm   Pulse: 87   Weight: 65

## 2018-12-05 NOTE — ANESTHESIA POSTPROCEDURE EVALUATION
Patient: Bessy Howard    Procedure Summary     Date:  12/05/18 Room / Location:  [facility] ENDOSCOPY 01 / [facility] ENDO    Anesthesia Start:  8968 Anesthesia Stop:  5246    Procedures:       COLONOSCOPY (N/A )      ESOPHAGOGASTRODUODENOSCOPY (EGD) (N/A ) Julián Justice

## 2018-12-05 NOTE — BRIEF OP NOTE
Pre-Operative Diagnosis: Colon cancer screening,  Heart burn, epigastric pain     Post-Operative Diagnosis: Uncomplicated diverticulosis, internal hemorrhoids; gastropathy     Procedure Performed:   Procedure(s):  COLONOSCOPY and ESOPHAGOGASTRODUODENOSCOPY

## 2018-12-05 NOTE — H&P
History & Physical Examination    Patient Name: Addie Koenig  MRN: [id number]  [facility]: [id number]  YOB: 1950    Diagnosis: Colorectal screening, epigastric pain, heartburn      Medications Prior to Admission:  Rosuvastatin Calcium 10 MG Oral T They understand and agree to proceed with plan of care. I have reviewed the History and Physical done within the last 30 days. Any changes noted above.   Astrid Acosta, [address] [place] [address] - Gastroenterology  12/5/2018  9:06 AM

## 2018-12-07 ENCOUNTER — TELEPHONE (OUTPATIENT)
Dept: GASTROENTEROLOGY | Facility: CLINIC | Age: 68
End: 2018-12-07

## 2018-12-07 VITALS
HEART RATE: 77 BPM | HEIGHT: 62 IN | OXYGEN SATURATION: 98 % | SYSTOLIC BLOOD PRESSURE: 97 MMHG | DIASTOLIC BLOOD PRESSURE: 60 MMHG | WEIGHT: 145 LBS | BODY MASS INDEX: 26.68 KG/M2 | RESPIRATION RATE: 22 BRPM

## 2018-12-07 RX ORDER — PANTOPRAZOLE SODIUM 40 MG/1
40 TABLET, DELAYED RELEASE ORAL
Qty: 90 TABLET | Refills: 3 | Status: SHIPPED | OUTPATIENT
Start: 2018-12-07 | End: 2019-04-09

## 2018-12-07 RX ORDER — OMEPRAZOLE 40 MG/1
40 CAPSULE, DELAYED RELEASE ORAL
Qty: 90 CAPSULE | Refills: 3 | Status: SHIPPED | OUTPATIENT
Start: 2018-12-07 | End: 2018-12-07 | Stop reason: ALTCHOICE

## 2018-12-07 RX ORDER — SUCRALFATE 1 G/1
1 TABLET ORAL
Qty: 120 TABLET | Refills: 0 | Status: SHIPPED | OUTPATIENT
Start: 2018-12-07 | End: 2019-02-13 | Stop reason: ALTCHOICE

## 2018-12-07 NOTE — TELEPHONE ENCOUNTER
I spoke to the daughter in law again and I advised her you sent the Carafate. 1) Now she is asking for pantoprazole instead of the omeprazole.  She state this has been more effective for her in the past.    2) Also she is asking for hydrocortisone cream

## 2018-12-07 NOTE — TELEPHONE ENCOUNTER
Dr Camilla Melgar     Pt was taking omeprazole 20 mg and then a month ago Frank Guallpa put her up to 40 mg of omeprazole and referred her to us.     Please advise next step

## 2018-12-07 NOTE — TELEPHONE ENCOUNTER
E Rx for omeprazole sent. Patient should take it every day before breakfast.  If this does not work for her pain then she should go to the emergency room. Please call patient's family to inform.

## 2018-12-07 NOTE — TELEPHONE ENCOUNTER
Pt daughter in law called with the pt on the line and would like to speak to nurse .  States pt is still having abdominal pain

## 2018-12-07 NOTE — TELEPHONE ENCOUNTER
Left message on Melanie's voicemail that the pantoprazole and hydrocortisone cream was sent to the pharmacy.     Deleted the omeprazole from the medication list

## 2018-12-07 NOTE — TELEPHONE ENCOUNTER
E Rx for pantoprazole and hydrocortisone rectal cream was sent. Please delete omeprazole prescription, then close the encounter.

## 2018-12-07 NOTE — TELEPHONE ENCOUNTER
Dr Jodee Miller,    I spoke to the pt's daughter in law. Pt is still experiencing pain and is wondering if you are going to prescribe her medication. Please advise on pathology and f/u and I will call the pt.

## 2018-12-07 NOTE — TELEPHONE ENCOUNTER
Sorry it was not listed in her med list.  Now it should be in there because I just prescribed it.   We will add Carafate 1 g 4 times daily as needed

## 2018-12-09 ENCOUNTER — TELEPHONE (OUTPATIENT)
Dept: GASTROENTEROLOGY | Facility: CLINIC | Age: 68
End: 2018-12-09

## 2018-12-10 NOTE — TELEPHONE ENCOUNTER
Entered into Epic:Recall colon in 10 years per Dr. Coretta Partida. Last Colon done 12/5/18, next due 12/5/28. Snapshot updated. Letter mailed.

## 2018-12-14 NOTE — TELEPHONE ENCOUNTER
I recommend that patient continue pantoprazole daily for 1 month, then every other day for 1 month, then stop if she is tolerating this. She has persistent GERD then she may need to resume on a daily basis. Please relate to the patient's family.

## 2018-12-14 NOTE — TELEPHONE ENCOUNTER
Pt contacted, permission obtained to speak with daughter. Recommendations relayed with understanding. Would like to know how long she should stay on pantoprazole or indefinitely. EBS please advise.

## 2018-12-18 ENCOUNTER — TELEPHONE (OUTPATIENT)
Dept: GASTROENTEROLOGY | Facility: CLINIC | Age: 68
End: 2018-12-18

## 2018-12-18 RX ORDER — RANITIDINE 150 MG/1
150 CAPSULE ORAL 2 TIMES DAILY
Qty: 60 CAPSULE | Refills: 0 | Status: SHIPPED | OUTPATIENT
Start: 2018-12-18 | End: 2019-02-13 | Stop reason: ALTCHOICE

## 2018-12-18 NOTE — TELEPHONE ENCOUNTER
Returned call to patient's daughter. Informed her of Dr. Jasmine Ya instructions below. She verbalized her understanding. She will call office back if symptoms do not resolve in 1 week.

## 2018-12-18 NOTE — TELEPHONE ENCOUNTER
I spoke to the pt's daughter in law over the phone. Pt with a hx of heartburn and constipation.  Colon/EGD 12/05/18    She states the pt is vacillating between constipation and urgency    She is also experiencing cramping prior to a bowel movement    She

## 2018-12-18 NOTE — TELEPHONE ENCOUNTER
Pt daughter in law states the pt is having some symptoms and wondering if its due to medication       Current Outpatient Medications:   •  sucralfate (CARAFATE) 1 g Oral Tab, Take 1 tablet (1 g total) by mouth 4 (four) times daily before meals and nightly.

## 2018-12-18 NOTE — TELEPHONE ENCOUNTER
Patient should stop Carafate which causes constipation. She should take OTC MiraLAX 17 g daily, as needed for constipation. Ranitidine has been prescribed for breakthrough symptoms of heartburn or GERD. Please advise patient's daughter.

## 2018-12-31 ENCOUNTER — TELEPHONE (OUTPATIENT)
Dept: FAMILY MEDICINE CLINIC | Facility: CLINIC | Age: 68
End: 2018-12-31

## 2018-12-31 NOTE — TELEPHONE ENCOUNTER
Pt requesting orders for complete annual labs be added to the chart. Please call back with confirmation once added.

## 2019-01-07 NOTE — TELEPHONE ENCOUNTER
Called pt and informed her of message below pt voice understanding and she just wanted make sure if she needed one and to make sure  the lipid order was order already in the system

## 2019-02-13 ENCOUNTER — APPOINTMENT (OUTPATIENT)
Dept: LAB | Age: 69
End: 2019-02-13
Attending: FAMILY MEDICINE
Payer: COMMERCIAL

## 2019-02-13 ENCOUNTER — OFFICE VISIT (OUTPATIENT)
Dept: FAMILY MEDICINE CLINIC | Facility: CLINIC | Age: 69
End: 2019-02-13
Payer: COMMERCIAL

## 2019-02-13 VITALS
WEIGHT: 141 LBS | BODY MASS INDEX: 25.95 KG/M2 | HEIGHT: 62 IN | HEART RATE: 67 BPM | SYSTOLIC BLOOD PRESSURE: 136 MMHG | TEMPERATURE: 98 F | DIASTOLIC BLOOD PRESSURE: 84 MMHG

## 2019-02-13 DIAGNOSIS — K21.9 GASTROESOPHAGEAL REFLUX DISEASE, ESOPHAGITIS PRESENCE NOT SPECIFIED: ICD-10-CM

## 2019-02-13 DIAGNOSIS — Z78.0 POSTMENOPAUSAL: ICD-10-CM

## 2019-02-13 DIAGNOSIS — Z12.31 ENCOUNTER FOR SCREENING MAMMOGRAM FOR BREAST CANCER: ICD-10-CM

## 2019-02-13 DIAGNOSIS — E78.00 HYPERCHOLESTEREMIA: Primary | ICD-10-CM

## 2019-02-13 DIAGNOSIS — Z63.6 CAREGIVER STRESS: ICD-10-CM

## 2019-02-13 DIAGNOSIS — H25.13 AGE-RELATED NUCLEAR CATARACT OF BOTH EYES: ICD-10-CM

## 2019-02-13 DIAGNOSIS — Z63.79 STRESS DUE TO SPOUSE WITH DEMENTIA: ICD-10-CM

## 2019-02-13 PROCEDURE — 99214 OFFICE O/P EST MOD 30 MIN: CPT | Performed by: FAMILY MEDICINE

## 2019-02-13 PROCEDURE — 99212 OFFICE O/P EST SF 10 MIN: CPT | Performed by: FAMILY MEDICINE

## 2019-02-13 SDOH — SOCIAL STABILITY - SOCIAL INSECURITY: DEPENDENT RELATIVE NEEDING CARE AT HOME: Z63.6

## 2019-02-13 NOTE — PROGRESS NOTES
HPI:    Patient ID: Amanda Black is a 70year old female. HPI  Patient presents with:  Cholesterol: follow up     Patient here for follow-up. She would like referral to see an eye doctor to follow-up on cataracts.     She otherwise has been doing well External Cream Apply 1 Application topically 2 (two) times daily. Apply every morning and evening.  Disp: 15 g Rfl: 0     Allergies:  Bactrim [Sulfametho*    SWELLING, Tightness in Throat  Macrobid [Nitrofura*    RASH, PAIN, Tightness in Chest  Crestor [Ros

## 2019-03-02 ENCOUNTER — LAB ENCOUNTER (OUTPATIENT)
Dept: LAB | Age: 69
End: 2019-03-02
Attending: FAMILY MEDICINE
Payer: COMMERCIAL

## 2019-03-02 DIAGNOSIS — K21.9 GASTROESOPHAGEAL REFLUX DISEASE, ESOPHAGITIS PRESENCE NOT SPECIFIED: ICD-10-CM

## 2019-03-02 DIAGNOSIS — E78.00 HYPERCHOLESTEREMIA: ICD-10-CM

## 2019-03-02 LAB
ALBUMIN SERPL-MCNC: 3.8 G/DL (ref 3.4–5)
ALBUMIN/GLOB SERPL: 1 {RATIO} (ref 1–2)
ALP LIVER SERPL-CCNC: 115 U/L (ref 55–142)
ALT SERPL-CCNC: 22 U/L (ref 13–56)
ANION GAP SERPL CALC-SCNC: 7 MMOL/L (ref 0–18)
AST SERPL-CCNC: 17 U/L (ref 15–37)
BASOPHILS # BLD AUTO: 0.03 X10(3) UL (ref 0–0.2)
BASOPHILS NFR BLD AUTO: 0.6 %
BILIRUB SERPL-MCNC: 0.6 MG/DL (ref 0.1–2)
BUN BLD-MCNC: 17 MG/DL (ref 7–18)
BUN/CREAT SERPL: 22.4 (ref 10–20)
CALCIUM BLD-MCNC: 9.2 MG/DL (ref 8.5–10.1)
CHLORIDE SERPL-SCNC: 108 MMOL/L (ref 98–107)
CHOLEST SMN-MCNC: 252 MG/DL (ref ?–200)
CO2 SERPL-SCNC: 27 MMOL/L (ref 21–32)
CREAT BLD-MCNC: 0.76 MG/DL (ref 0.55–1.02)
DEPRECATED RDW RBC AUTO: 42.9 FL (ref 35.1–46.3)
EOSINOPHIL # BLD AUTO: 0.14 X10(3) UL (ref 0–0.7)
EOSINOPHIL NFR BLD AUTO: 2.7 %
ERYTHROCYTE [DISTWIDTH] IN BLOOD BY AUTOMATED COUNT: 13.5 % (ref 11–15)
GLOBULIN PLAS-MCNC: 3.8 G/DL (ref 2.8–4.4)
GLUCOSE BLD-MCNC: 88 MG/DL (ref 70–99)
HCT VFR BLD AUTO: 42.1 % (ref 35–48)
HDLC SERPL-MCNC: 51 MG/DL (ref 40–59)
HGB BLD-MCNC: 13.9 G/DL (ref 12–16)
IMM GRANULOCYTES # BLD AUTO: 0.01 X10(3) UL (ref 0–1)
IMM GRANULOCYTES NFR BLD: 0.2 %
LDLC SERPL CALC-MCNC: 187 MG/DL (ref ?–100)
LYMPHOCYTES # BLD AUTO: 1.74 X10(3) UL (ref 1–4)
LYMPHOCYTES NFR BLD AUTO: 33.4 %
M PROTEIN MFR SERPL ELPH: 7.6 G/DL (ref 6.4–8.2)
MCH RBC QN AUTO: 28.7 PG (ref 26–34)
MCHC RBC AUTO-ENTMCNC: 33 G/DL (ref 31–37)
MCV RBC AUTO: 87 FL (ref 80–100)
MONOCYTES # BLD AUTO: 0.44 X10(3) UL (ref 0.1–1)
MONOCYTES NFR BLD AUTO: 8.4 %
NEUTROPHILS # BLD AUTO: 2.85 X10 (3) UL (ref 1.5–7.7)
NEUTROPHILS # BLD AUTO: 2.85 X10(3) UL (ref 1.5–7.7)
NEUTROPHILS NFR BLD AUTO: 54.7 %
NONHDLC SERPL-MCNC: 201 MG/DL (ref ?–130)
OSMOLALITY SERPL CALC.SUM OF ELEC: 295 MOSM/KG (ref 275–295)
PLATELET # BLD AUTO: 245 10(3)UL (ref 150–450)
POTASSIUM SERPL-SCNC: 4.1 MMOL/L (ref 3.5–5.1)
RBC # BLD AUTO: 4.84 X10(6)UL (ref 3.8–5.3)
SODIUM SERPL-SCNC: 142 MMOL/L (ref 136–145)
TRIGL SERPL-MCNC: 68 MG/DL (ref 30–149)
VIT B12 SERPL-MCNC: 364 PG/ML (ref 193–986)
VLDLC SERPL CALC-MCNC: 14 MG/DL (ref 0–30)
WBC # BLD AUTO: 5.2 X10(3) UL (ref 4–11)

## 2019-03-02 PROCEDURE — 80053 COMPREHEN METABOLIC PANEL: CPT

## 2019-03-02 PROCEDURE — 85025 COMPLETE CBC W/AUTO DIFF WBC: CPT

## 2019-03-02 PROCEDURE — 80061 LIPID PANEL: CPT

## 2019-03-02 PROCEDURE — 82607 VITAMIN B-12: CPT

## 2019-03-02 PROCEDURE — 36415 COLL VENOUS BLD VENIPUNCTURE: CPT

## 2019-03-06 RX ORDER — SIMVASTATIN 10 MG
10 TABLET ORAL DAILY
Qty: 90 TABLET | Refills: 0 | Status: SHIPPED | OUTPATIENT
Start: 2019-03-06 | End: 2019-04-09

## 2019-03-11 ENCOUNTER — HOSPITAL ENCOUNTER (OUTPATIENT)
Dept: BONE DENSITY | Age: 69
Discharge: HOME | End: 2019-03-11
Attending: FAMILY MEDICINE
Payer: COMMERCIAL

## 2019-03-11 DIAGNOSIS — Z78.0 POSTMENOPAUSAL: ICD-10-CM

## 2019-03-11 PROCEDURE — 77080 DXA BONE DENSITY AXIAL: CPT | Performed by: FAMILY MEDICINE

## 2019-03-28 ENCOUNTER — OFFICE VISIT (OUTPATIENT)
Dept: OPTOMETRY | Facility: CLINIC | Age: 69
End: 2019-03-28
Payer: COMMERCIAL

## 2019-03-28 DIAGNOSIS — H25.13 AGE-RELATED NUCLEAR CATARACT OF BOTH EYES: Primary | ICD-10-CM

## 2019-03-28 PROCEDURE — 92014 COMPRE OPH EXAM EST PT 1/>: CPT | Performed by: OPTOMETRIST

## 2019-03-28 NOTE — PATIENT INSTRUCTIONS
Glaucoma suspect of both eyes  No change in appearance of optic nerve. OCT and VF testing last year was completely normal. One year EE VF OCT. Age-related nuclear cataract of both eyes  No treatment is required. Will continue to observe.

## 2019-03-28 NOTE — PROGRESS NOTES
Flaquita Silverio is a 70year old female. HPI:     HPI     Patient is here for her annual eye exam. [facility] and she was a glaucoma suspect due to CD asymmetry and enlarged CD ratio.  She had a VF that was completely normal and her OCT w morning and evening.  Disp: 15 g Rfl: 0       Allergies:    Bactrim [Sulfametho*    SWELLING, Tightness in Throat  Macrobid [Nitrofura*    RASH, PAIN, Tightness in Chest  Crestor [Rosuvastat*    PAIN    Comment:Abdominal pain  Penicillins             PAIN Sphere Cylinder Axis Add    Right +3.00 -0.50 080 +2.50    Left +2.50 -0.75 075 +2.50    Type:  Progressive bifocal          Manifest Refraction       Sphere Cylinder Axis Dist VA Add    Right +3.00 -0.50 090 20/20- +2.50    Left +2.50 -0.75 075 20/20- +2.

## 2019-04-08 ENCOUNTER — NURSE TRIAGE (OUTPATIENT)
Dept: OTHER | Age: 69
End: 2019-04-08

## 2019-04-08 NOTE — TELEPHONE ENCOUNTER
Action Requested: Summary for Provider     []  Critical Lab, Recommendations Needed  [x] Need Additional Advice  []   FYI    []   Need Orders  [] Need Medications Sent to Pharmacy  []  Other     SUMMARY: Leisa daughter in law called with patient in the

## 2019-04-09 ENCOUNTER — TELEPHONE (OUTPATIENT)
Dept: OTHER | Age: 69
End: 2019-04-09

## 2019-04-09 ENCOUNTER — OFFICE VISIT (OUTPATIENT)
Dept: FAMILY MEDICINE CLINIC | Facility: CLINIC | Age: 69
End: 2019-04-09
Payer: COMMERCIAL

## 2019-04-09 VITALS
HEART RATE: 81 BPM | BODY MASS INDEX: 26.5 KG/M2 | WEIGHT: 144 LBS | DIASTOLIC BLOOD PRESSURE: 85 MMHG | HEIGHT: 62 IN | SYSTOLIC BLOOD PRESSURE: 137 MMHG

## 2019-04-09 DIAGNOSIS — A08.4 VIRAL GASTROENTERITIS: ICD-10-CM

## 2019-04-09 DIAGNOSIS — R30.0 DYSURIA: Primary | ICD-10-CM

## 2019-04-09 DIAGNOSIS — K21.9 GASTROESOPHAGEAL REFLUX DISEASE, ESOPHAGITIS PRESENCE NOT SPECIFIED: ICD-10-CM

## 2019-04-09 PROCEDURE — 81003 URINALYSIS AUTO W/O SCOPE: CPT | Performed by: NURSE PRACTITIONER

## 2019-04-09 PROCEDURE — 99214 OFFICE O/P EST MOD 30 MIN: CPT | Performed by: NURSE PRACTITIONER

## 2019-04-09 RX ORDER — PANTOPRAZOLE SODIUM 40 MG/1
40 TABLET, DELAYED RELEASE ORAL
Qty: 90 TABLET | Refills: 1 | Status: SHIPPED | OUTPATIENT
Start: 2019-04-09 | End: 2020-04-20

## 2019-04-09 RX ORDER — AMOXICILLIN AND CLAVULANATE POTASSIUM 875; 125 MG/1; MG/1
1 TABLET, FILM COATED ORAL 2 TIMES DAILY
Qty: 14 TABLET | Refills: 0 | Status: SHIPPED | OUTPATIENT
Start: 2019-04-09 | End: 2019-04-16

## 2019-04-09 NOTE — TELEPHONE ENCOUNTER
Pt's daughter contacts clinic stating symptoms are unimproved and requests appt to be seen. Dysuria, abdominal pain, diarrhea. See triage encounter 4/8. Acute visit booked today with female provider per pt request.  ADO at 1:15 with [facility].   Pt accepts appt

## 2019-04-09 NOTE — PATIENT INSTRUCTIONS
Viral Gastroenteritis (Adult)    Gastroenteritis is commonly called the stomach flu. It is most often caused by a virus that affects the stomach and intestinal tract and usually lasts from 2 to 7 days.  Common viruses causing gastroenteritis include norov You may use acetaminophen or NSAID medicines like ibuprofen or naproxen to control fever unless another medicine was given. If you have chronic liver or kidney disease, talk with your healthcare provider before using these medicines.  Also talk with your pr · Limit fat intake to less than 15 grams per day. Do this by avoiding margarine, butter, oils, mayonnaise, sauces, gravies, fried foods, peanut butter, meat, poultry, and fish.   · Limit fiber and avoid raw or cooked vegetables, fresh fruits (except bananas The esophagus is a tube that carries food from the mouth to the stomach. A valve (the LES, lower esophageal sphincter) at the lower end of the esophagus prevents stomach acid from flowing upward.  When this valve doesn't work properly, stomach contents may · If your symptoms occur during sleep, use a foam wedge to elevate your upper body (not just your head.) Or, place 4\" blocks under the head of your bed. Or use 2 bed risers under your bedframe.   Medicines  If needed, medicines can help relieve the symptom © 8854-4892 [employer] [address]. [address] [facility], [address] [place]. All rights reserved. This information is not intended as a substitute for professional medical care. Always follow your healthcare professional's instructions.         Bladder The most common cause of bladder infections is bacteria from the bowels. The bacteria get onto the skin around the opening of the urethra. From there, they can get into the urine and travel up to the bladder, causing inflammation and infection.  This usuall · Urinate more often. Don't try to hold urine in for a long time. · Wear loose-fitting clothes and cotton underwear. Avoid tight-fitting pants. · Improve your diet and prevent constipation.  Eat more fresh fruit and vegetables, and fiber, and less junk an

## 2019-04-09 NOTE — ASSESSMENT & PLAN NOTE
Pt has multiple allergies (states that she is not allergic to pcn only gets an upset stomach)  Start augmentin 875 mg I po bid x 7 days  Supportive care discussed to help alleviate symptoms  Please call if symptoms worsen or are not resolving.     Urine dip

## 2019-04-10 ENCOUNTER — NURSE TRIAGE (OUTPATIENT)
Dept: OTHER | Age: 69
End: 2019-04-10

## 2019-04-10 RX ORDER — ONDANSETRON 4 MG/1
4 TABLET, FILM COATED ORAL EVERY 8 HOURS PRN
Qty: 20 TABLET | Refills: 0 | Status: SHIPPED | OUTPATIENT
Start: 2019-04-10 | End: 2019-08-12

## 2019-04-10 RX ORDER — CEFUROXIME AXETIL 250 MG/1
250 TABLET ORAL 2 TIMES DAILY
Qty: 14 TABLET | Refills: 0 | Status: SHIPPED | OUTPATIENT
Start: 2019-04-10 | End: 2019-04-20

## 2019-04-10 NOTE — TELEPHONE ENCOUNTER
Stop augmentin-start cefuroxime 250 mg 1 po bid x 7 days. It is important that pt takes on full stomach.

## 2019-04-10 NOTE — TELEPHONE ENCOUNTER
Melanie daughter in law called indicated that patient saw Devan Mcdaniel yesterday and was given augmentin. Patient took one dose of augmentin yesterday and then vomited 1 hour later. Patient has not taken antibiotic since first dose yesterday.   Prelimin

## 2019-04-10 NOTE — TELEPHONE ENCOUNTER
Iv anbx are not appropriate at this time.  I will send in some anti nausea medication for pt-zofran 4 mg I po tid prn

## 2019-04-10 NOTE — TELEPHONE ENCOUNTER
Reviewed Tiffanie's recommendations with pt's daughter-in-law, Marcelo Alston. Marcelo Alston is concerned about pt taking antibiotics orally, states pt has not been able to eat anything, only taking liquids and eating only small amts of food such as crackers or banana.

## 2019-04-11 ENCOUNTER — TELEPHONE (OUTPATIENT)
Dept: OTHER | Age: 69
End: 2019-04-11

## 2019-04-11 NOTE — TELEPHONE ENCOUNTER
Savita from "[employer]" lab calling to report yesterday pt results for urine culture states she had E Coli, per lab it is not E Coli it is Klebsiella. APN not available today. Routed to pt PCP for review.

## 2019-04-20 RX ORDER — CEFUROXIME AXETIL 250 MG/1
250 TABLET ORAL 2 TIMES DAILY
Qty: 14 TABLET | Refills: 0 | Status: SHIPPED | OUTPATIENT
Start: 2019-04-20 | End: 2019-04-27

## 2019-04-20 NOTE — TELEPHONE ENCOUNTER
Chart reviewed and urine culture reviewed. May refill cefuroxine if still symptomatic times one. The bacteria is resistant to the amoxicillin. To follow up if not better or urgent care/ ER if worse over the weekend. Erx sent to pharmacy.  Please notify pt/

## 2019-04-20 NOTE — TELEPHONE ENCOUNTER
Patient's daughter in law, Alex Soria (on hipaa) called in. Patient see in office for UTI on 4/9/19 by Rae Tsai and was prescribed Amoxicillin-Pot Clavulanate 875-125 MG which she stated \"bothers her stomach. \" On 4/10/19  her daughter in law call

## 2019-04-20 NOTE — TELEPHONE ENCOUNTER
Spoke with patient's daughter-in-law named George Olden (who is on HIPAA) and informed her of Dr. Karen Chu order and plan of care. Melanie voiced understanding and agreed with the plan of care and scheduled an appointment with Dr. Christiano Juan for 4/22/19.  George Boston says

## 2019-04-22 ENCOUNTER — OFFICE VISIT (OUTPATIENT)
Dept: FAMILY MEDICINE CLINIC | Facility: CLINIC | Age: 69
End: 2019-04-22
Payer: COMMERCIAL

## 2019-04-22 ENCOUNTER — TELEPHONE (OUTPATIENT)
Dept: FAMILY MEDICINE CLINIC | Facility: CLINIC | Age: 69
End: 2019-04-22

## 2019-04-22 VITALS
DIASTOLIC BLOOD PRESSURE: 76 MMHG | HEART RATE: 72 BPM | WEIGHT: 143 LBS | SYSTOLIC BLOOD PRESSURE: 124 MMHG | TEMPERATURE: 98 F | BODY MASS INDEX: 26.31 KG/M2 | HEIGHT: 62 IN

## 2019-04-22 DIAGNOSIS — J06.9 URI, ACUTE: ICD-10-CM

## 2019-04-22 DIAGNOSIS — N30.00 ACUTE CYSTITIS WITHOUT HEMATURIA: ICD-10-CM

## 2019-04-22 DIAGNOSIS — R51.9 FRONTAL HEADACHE: Primary | ICD-10-CM

## 2019-04-22 DIAGNOSIS — J02.9 SORE THROAT: ICD-10-CM

## 2019-04-22 PROCEDURE — G0463 HOSPITAL OUTPT CLINIC VISIT: HCPCS | Performed by: FAMILY MEDICINE

## 2019-04-22 PROCEDURE — 99214 OFFICE O/P EST MOD 30 MIN: CPT | Performed by: FAMILY MEDICINE

## 2019-04-22 NOTE — TELEPHONE ENCOUNTER
Patient daughter in law called re: appt today. States patient having headaches un sure if related  to antibiotic. Wants a new appt for the UTI f/u scheduled for 4/29/19 @ 10ma in [facility] with [place].   Will keep today's appt but needs to discuss headaches

## 2019-04-22 NOTE — PROGRESS NOTES
HPI:    Patient ID: Yun Gill is a 70year old female. HPI  Patient presents with:   Follow - Up: on UTI    Body ache and/or chills: pt also c/o hadache and sore throat     Acid reflux better  Saw Marcelina Ignacio 4/9 for uti   Was given augmentin x 7 days 97.7 °F (36.5 °C) (Oral)   Ht 5' 2\" (1.575 m)   Wt 143 lb (64.9 kg)   BMI 26.16 kg/m²            Current Outpatient Medications:  Cefuroxime Axetil 250 MG Oral Tab Take 1 tablet (250 mg total) by mouth 2 (two) times daily for 7 days.  Disp: 14 tablet Rfl: persist recommend phone follow-up if worsening headache would go to the emergency room    2. Sore throat  Warm saline rinses. .  Patient was already an antibiotic when symptoms started    3. URI, acute      4.  Acute cystitis without hematuria  Symptoms reso

## 2019-04-27 ENCOUNTER — NURSE TRIAGE (OUTPATIENT)
Dept: OTHER | Age: 69
End: 2019-04-27

## 2019-04-27 NOTE — TELEPHONE ENCOUNTER
Action Requested: Summary for Provider     []  Critical Lab, Recommendations Needed  [] Need Additional Advice  []   FYI    []   Need Orders  [] Need Medications Sent to Pharmacy  []  Other     SUMMARY:Pt's daughter stated -Pt took ABX about 10 days ago- m

## 2019-04-27 NOTE — TELEPHONE ENCOUNTER
Verified pt name and  with pt's daughter-in-law, Celine Rios. Reviewed PA's recommendations as noted below. Celine Rios will inform pt of recommendations, not sure pt will go to IC as advised.  States she contacted the [address] and was informed no female doc

## 2019-05-02 ENCOUNTER — OFFICE VISIT (OUTPATIENT)
Dept: FAMILY MEDICINE CLINIC | Facility: CLINIC | Age: 69
End: 2019-05-02
Payer: MEDICAID

## 2019-05-02 VITALS
SYSTOLIC BLOOD PRESSURE: 144 MMHG | BODY MASS INDEX: 26 KG/M2 | DIASTOLIC BLOOD PRESSURE: 85 MMHG | HEIGHT: 62 IN | HEART RATE: 70 BPM

## 2019-05-02 DIAGNOSIS — B37.3 VAGINAL CANDIDIASIS: Primary | ICD-10-CM

## 2019-05-02 PROBLEM — B37.31 VAGINAL CANDIDIASIS: Status: ACTIVE | Noted: 2019-05-02

## 2019-05-02 PROCEDURE — 99214 OFFICE O/P EST MOD 30 MIN: CPT | Performed by: NURSE PRACTITIONER

## 2019-05-02 RX ORDER — CLOTRIMAZOLE 1 %
CREAM (GRAM) TOPICAL
COMMUNITY
Start: 2019-04-29 | End: 2019-05-27

## 2019-05-02 RX ORDER — LORATADINE 10 MG/1
TABLET ORAL
COMMUNITY
Start: 2019-04-29 | End: 2019-08-12

## 2019-05-02 NOTE — ASSESSMENT & PLAN NOTE
Most likely due to recent anbx use  Supportive care discussed to help alleviate symptoms  Please call if symptoms worsen or are not resolving.     Declines oral diflucan due to very sensitive stomach

## 2019-05-02 NOTE — PROGRESS NOTES
HPI  Pt here for groin rash-[facility] and was prescribed clotrimazole cream-has been using for past 3 days without any improvement. Having some vaginal discharge-thick white and some burning rc after urination.   Was recently on an Inability: Not on file      Transportation needs:        Medical: Not on file        Non-medical: Not on file    Tobacco Use      Smoking status: Never Smoker      Smokeless tobacco: Never Used    Substance and Sexual Activity      Alcohol use: No      Dr Allergies:    Bactrim [Sulfametho*    SWELLING, Tightness in Throat  Macrobid [Nitrofura*    RASH, PAIN, Tightness in Chest  Crestor [Rosuvastat*    PAIN    Comment:Abdominal pain  Pravastatin             MYALGIA    Physical Exam   Nursing note and v

## 2019-05-04 ENCOUNTER — TELEPHONE (OUTPATIENT)
Dept: FAMILY MEDICINE CLINIC | Facility: CLINIC | Age: 69
End: 2019-05-04

## 2019-05-04 NOTE — TELEPHONE ENCOUNTER
Pt's dtr in law Melanie (NATE) called and states pt is having side effect, a burning sensation ,from vaginal cream prescribed by [facility] on 5/2. Advised to stop the medication and  Instruct proper hygiene. Melanie verbalized understanding.  Pt requested appt wit

## 2019-05-06 ENCOUNTER — OFFICE VISIT (OUTPATIENT)
Dept: FAMILY MEDICINE CLINIC | Facility: CLINIC | Age: 69
End: 2019-05-06
Payer: MEDICAID

## 2019-05-06 VITALS
TEMPERATURE: 98 F | DIASTOLIC BLOOD PRESSURE: 73 MMHG | BODY MASS INDEX: 25.95 KG/M2 | WEIGHT: 141 LBS | SYSTOLIC BLOOD PRESSURE: 149 MMHG | HEART RATE: 76 BPM | HEIGHT: 62 IN

## 2019-05-06 DIAGNOSIS — N89.8 VAGINAL ITCHING: Primary | ICD-10-CM

## 2019-05-06 PROCEDURE — 99213 OFFICE O/P EST LOW 20 MIN: CPT | Performed by: FAMILY MEDICINE

## 2019-05-06 PROCEDURE — 99212 OFFICE O/P EST SF 10 MIN: CPT | Performed by: FAMILY MEDICINE

## 2019-05-06 NOTE — PROGRESS NOTES
HPI:    Patient ID: Joseph Harrington is a 70year old female. HPI  Patient presents with:   Follow - Up: saw Maritza Bone on 5-2-19 for vaginal yeast infection feels a little better    C/o vaginal itching and burning  Saw Cain Mendez 5/2/19 for this    Was Prescribed t Constitutional: She appears well-developed and well-nourished. Cardiovascular: Normal rate, regular rhythm and normal heart sounds. Pulmonary/Chest: Effort normal and breath sounds normal.   Genitourinary: No labial fusion.  There is no rash, tenderne

## 2019-05-15 ENCOUNTER — NURSE TRIAGE (OUTPATIENT)
Dept: OTHER | Age: 69
End: 2019-05-15

## 2019-05-15 DIAGNOSIS — N94.9 VAGINAL BURNING: Primary | ICD-10-CM

## 2019-05-15 NOTE — TELEPHONE ENCOUNTER
Daughter in law called indicated that patient saw Dr Kimberly Espinoza on 5/6/19. Coconut oil did help for a short time but still having the vaginal burning. Has vaginal burning before or after urinating. No issues with urinating. No fevers. No vaginal itching.   No vag

## 2019-05-15 NOTE — TELEPHONE ENCOUNTER
Melanie informed of AMALIS's recommendation below and was provided with gyne referral info to call and schedule appt.

## 2019-05-25 ENCOUNTER — NURSE TRIAGE (OUTPATIENT)
Dept: OTHER | Age: 69
End: 2019-05-25

## 2019-05-25 DIAGNOSIS — R30.0 DYSURIA: Primary | ICD-10-CM

## 2019-05-25 NOTE — TELEPHONE ENCOUNTER
Received a call from the patient's daughter-in-law who is requesting to know the status of the message below. Text-page sent to provider on-call. Encounter routed as well.

## 2019-05-25 NOTE — TELEPHONE ENCOUNTER
Action Requested: Summary for Provider     []  Critical Lab, Recommendations Needed  [x] Need Additional Advice  []   FYI    []   Need Orders  [] Need Medications Sent to Pharmacy  []  Other     SUMMARY: pt's daughter-in-law called to report that pt has be

## 2019-05-25 NOTE — TELEPHONE ENCOUNTER
On Call paged- I will order urinalysis and urine culture to see if UTI. Was on treatment about 6 weeks ago. I spoke with daughter-in-law for 10 minutes - was more concerned about the hot flashes. I explained that is not something to treat over the phone.

## 2019-06-11 ENCOUNTER — TELEPHONE (OUTPATIENT)
Dept: OBGYN CLINIC | Facility: CLINIC | Age: 69
End: 2019-06-11

## 2019-06-11 ENCOUNTER — OFFICE VISIT (OUTPATIENT)
Dept: OBGYN CLINIC | Facility: CLINIC | Age: 69
End: 2019-06-11
Payer: MEDICAID

## 2019-06-11 VITALS
BODY MASS INDEX: 26.13 KG/M2 | DIASTOLIC BLOOD PRESSURE: 82 MMHG | HEIGHT: 62 IN | SYSTOLIC BLOOD PRESSURE: 144 MMHG | WEIGHT: 142 LBS | HEART RATE: 82 BPM

## 2019-06-11 DIAGNOSIS — N76.2 ACUTE VULVITIS: Primary | ICD-10-CM

## 2019-06-11 DIAGNOSIS — N89.8 VAGINAL DRYNESS: ICD-10-CM

## 2019-06-11 PROCEDURE — 99204 OFFICE O/P NEW MOD 45 MIN: CPT | Performed by: OBSTETRICS & GYNECOLOGY

## 2019-06-11 NOTE — PROGRESS NOTES
Kimberly Pathak is a 70year old female  No LMP recorded. Patient is postmenopausal. Patient presents with:  Gyn Problem: new patient. ..vaginal burning -- saw midwives last year but issue still present --outside vaginal burning x one month.  Was given Used    Substance and Sexual Activity      Alcohol use: No      Drug use: No      Sexual activity: Not on file    Lifestyle      Physical activity:        Days per week: Not on file        Minutes per session: Not on file      Stress: Not on file    Relati denies shortness of breath  Gastrointestinal:   denies heartburn, abdominal pain, diarrhea or constipation  Genitourinary:    denies dysuria, incontinence, abnormal vaginal discharge, vaginal itching  Musculoskeletal:   denies back pain.   Skin/Breast: leaving -- reviewed chart in detail after pt left -- very high vaginal estrogen dosing given by midwife & u/s w/ strip 6.7 mm & ovarian cyst -- simple. Pt never did f/u u/s that was ordered by Dr. Kiana Correia.  Will have office call pt & tell her to do u/s to make

## 2019-06-11 NOTE — TELEPHONE ENCOUNTER
CAll pt & inform her I reviewed events that she experienced with other gyne office (midwives) -- u/s done at that time was not normal -- lining within uterus too thick for her age but that could be from the high dose of vaginal estrogen given.  Pt never did

## 2019-06-11 NOTE — TELEPHONE ENCOUNTER
SPOKE WITH PT AND GAVE RECS FROM [facility]. PT'S DAUGHTER GOT ON THE PHONE ALSO AND SAID THEY WERE UNAWARE DR. John Olguin ORDERED AN ULTRASOUND LAST FALL. THEY WILL SCHEDULE BOTH TESTS AND THEN CALL BACK TO MAKE AN APPT WITH [facility] TO DISCUSS THE RESULTS.

## 2019-06-20 ENCOUNTER — TELEPHONE (OUTPATIENT)
Dept: FAMILY MEDICINE CLINIC | Facility: CLINIC | Age: 69
End: 2019-06-20

## 2019-06-20 DIAGNOSIS — R93.89 THICKENED ENDOMETRIUM: ICD-10-CM

## 2019-06-20 DIAGNOSIS — N95.0 PMB (POSTMENOPAUSAL BLEEDING): Primary | ICD-10-CM

## 2019-06-20 NOTE — TELEPHONE ENCOUNTER
Dr Gabe Fitzgerald,     Thank you for your prompt response, however; it appears Dr Sherrill Krause is following your request. It does not appear there is an urgency to get this done any sooner than your initial recommendation from office visit dated 09/25/2018    \"Pt never did f/u u/s that was ordered by Dr. Gabe Fitzgerald.  Will have office call pt & tell her to do u/s to make sure status of cyst\"

## 2019-06-20 NOTE — TELEPHONE ENCOUNTER
Dr Rowena Lehman,     Patient is scheduled to have a pelvic ultra sound tomorrow. It appears the patient had a pelvic ultra sound on 09/13/2018. Per your office notes dated 09/25/2018 you indicate to have a \"recheck right cyst 1 year. \" One full year has not passed. If you do not want the patient to have the test this soon, please have your clinical staff contact the patient ASAP and advise. Please message me back and let me know what your plans are for patient.      Thank you, Artemio

## 2019-06-21 ENCOUNTER — APPOINTMENT (OUTPATIENT)
Dept: ULTRASOUND IMAGING | Facility: HOSPITAL | Age: 69
End: 2019-06-21
Attending: FAMILY MEDICINE
Payer: MEDICAID

## 2019-06-21 ENCOUNTER — HOSPITAL ENCOUNTER (OUTPATIENT)
Dept: MAMMOGRAPHY | Age: 69
Discharge: HOME | End: 2019-06-21
Attending: FAMILY MEDICINE
Payer: MEDICAID

## 2019-06-21 DIAGNOSIS — Z12.31 ENCOUNTER FOR SCREENING MAMMOGRAM FOR BREAST CANCER: ICD-10-CM

## 2019-06-21 PROCEDURE — 77063 BREAST TOMOSYNTHESIS BI: CPT | Performed by: FAMILY MEDICINE

## 2019-06-21 PROCEDURE — 77067 SCR MAMMO BI INCL CAD: CPT | Performed by: FAMILY MEDICINE

## 2019-06-23 DIAGNOSIS — R92.2 INCONCLUSIVE MAMMOGRAM: Primary | ICD-10-CM

## 2019-06-24 ENCOUNTER — TELEPHONE (OUTPATIENT)
Dept: OBGYN CLINIC | Facility: CLINIC | Age: 69
End: 2019-06-24

## 2019-06-24 NOTE — TELEPHONE ENCOUNTER
Spoke with pt Son per pt request as pt does not speak [place]. Pt Son states pt had mammogram done and was told by [facility] dept pt to see [facility] regarding mammo results. Advised pt Son pt does not need to Trina Sosa regarding mammo.  Per notes from mammo pt is to have

## 2019-07-01 DIAGNOSIS — N63.10 BREAST MASS, RIGHT: Primary | ICD-10-CM

## 2019-07-01 NOTE — TELEPHONE ENCOUNTER
The ULTRASOUND PELVIS has been denied by the health plan. A denial letter has been faxed to you and your clinical staffs attn. Please reference letter for a [id number]. Also please follow up with patient for plan of care.      Thank you, Artemio

## 2019-07-08 NOTE — TELEPHONE ENCOUNTER
MESSAGE TO REMY TO SEE IF WE CAN STILL GET AUTHORIZATION USING THE NEW DX CODES. ONCE YOU GET A RESPONSE FROM THE INSURANCE PLEASE SEND MESSAGE BACK TO US SO WE CAN CALL THE PT OR PT'S Migel Forrest -[phone].

## 2019-07-08 NOTE — TELEPHONE ENCOUNTER
Called pt and spoke with daughter in law, Alissa. Nat informed of [facility] recs below and verbalized understanding. Nat informed that per Agnes's note below, pts US was approved and pt can call CS to set up appt. Alissa asking for # to CS to be sent to her via my chart. My chart message sent. nat asking what the next step will be if pts endometrial lining is still thickened. Explained to Alissa that per [facility] note, she would want to proceed with an endosee in the office and endosee was explained to daughter in law.

## 2019-07-08 NOTE — TELEPHONE ENCOUNTER
Called to inform pt that the US was approved, she stated she needed an auth for breast US which was order by Dr. Maliha Cabrera and I advised that office would have to get the approval for that [address]. When I clarified that I was calling in reference to approval of US of abdomen/pelvis for history of pmb and thickened endometrium pt's daughter in law stated pt does not have a history of pmb etc.  I then stated I would forward message to MD/ nursing staff to follow up with her to address.    Cpt [id number] was approved for 1DOS 7/8/19-8/22/19 [id number]

## 2019-07-08 NOTE — TELEPHONE ENCOUNTER
Please see all notes -- pt given very high vaginal estrogen dosing by midwives which caused  bleed last year. Hx thickened strip on pelvic u/s at that time. Pt needs vaginal ultrasound as followup on thickened endometrial strip & hx  bleed to see if I need to do endosee or other workup. Multiple misinformation to family. Once ultrasound done, then pt needs to see me in office for test results. She had appt for tomorrow for review of u/s report but appt cancelled -- IT WAS NOT FOR FOLLOWUP ON  MAMMOGRAM!!!!  Order pelvic u/s for  bleed & thickened endometrial stripe & have pt f/u w/ me to review results. Denial letter was for ovarian cyst to PCP.

## 2019-07-08 NOTE — TELEPHONE ENCOUNTER
Whether she had bleeding or not, the lining on ultrasound done prior was not normal -- if it is still not normal, will need evaluation

## 2019-07-09 ENCOUNTER — TELEPHONE (OUTPATIENT)
Dept: OTHER | Age: 69
End: 2019-07-09

## 2019-07-09 NOTE — TELEPHONE ENCOUNTER
Melanie the daughter in law who is on hippa is concerned about which mammogram is ordered is correct. The order was placed by the Mammogram department. I tried to explain the Mammogram with ultrasound is what should be ordered.     The call was chetna

## 2019-07-12 ENCOUNTER — TELEPHONE (OUTPATIENT)
Dept: FAMILY MEDICINE CLINIC | Facility: CLINIC | Age: 69
End: 2019-07-12

## 2019-07-12 ENCOUNTER — HOSPITAL ENCOUNTER (OUTPATIENT)
Dept: ULTRASOUND IMAGING | Facility: HOSPITAL | Age: 69
Discharge: HOME | End: 2019-07-12
Attending: FAMILY MEDICINE
Payer: MEDICAID

## 2019-07-12 ENCOUNTER — HOSPITAL ENCOUNTER (OUTPATIENT)
Dept: MAMMOGRAPHY | Facility: HOSPITAL | Age: 69
Discharge: HOME | End: 2019-07-12
Attending: FAMILY MEDICINE
Payer: MEDICAID

## 2019-07-12 DIAGNOSIS — R92.2 INCONCLUSIVE MAMMOGRAM: ICD-10-CM

## 2019-07-12 DIAGNOSIS — R92.8 ABNORMAL MAMMOGRAM: Primary | ICD-10-CM

## 2019-07-12 PROCEDURE — 77065 DX MAMMO INCL CAD UNI: CPT | Performed by: FAMILY MEDICINE

## 2019-07-12 PROCEDURE — 77061 BREAST TOMOSYNTHESIS UNI: CPT | Performed by: FAMILY MEDICINE

## 2019-07-12 PROCEDURE — 76642 ULTRASOUND BREAST LIMITED: CPT | Performed by: FAMILY MEDICINE

## 2019-07-12 NOTE — TELEPHONE ENCOUNTER
Nola Mg, Nurse from Radiology called asking for an US guided right breast core biopsy, they need the order by today so patient can get an appointment as soon as possible.

## 2019-07-12 NOTE — IMAGING NOTE
This nurse introduced self and role of breast coordinator. Discussed recommended breast biopsy with patient. Mrs. Dorian Rizzo was recommended by Dr. Joby Gomez to have a Right Breast ultrasound guided biopsy. Mrs. Dorian Rizzo is non-English speaking.   Hist (ANUSOL-HC) 2.5 % Rectal Cream Place 1 Application rectally 2 (two) times daily. Times 7 days only as needed Disp: 1 Tube Rfl: 2   hydrocortisone 2.5 % External Cream Apply 1 Application topically 2 (two) times daily. Apply every morning and evening.  Disp: applied to  the biopsy site and should be kept on for 5 days. Additional mammography films will then be taken to assure correct placement of the placed marker.       Educated the patient they will be awake during this procedure and are able to drive themse

## 2019-07-15 ENCOUNTER — TELEPHONE (OUTPATIENT)
Dept: MAMMOGRAPHY | Facility: HOSPITAL | Age: 69
End: 2019-07-15

## 2019-07-15 NOTE — TELEPHONE ENCOUNTER
Received order for ultrasound Core Biopsy for right breast order signed by Dr. Santiago Long and faxed back successful to the [facility]

## 2019-07-15 NOTE — TELEPHONE ENCOUNTER
Returned call to dtr in [facility] inquiring why post mammogram is needed. Explained clip placement and raionale for post mammogram needed.  Verbalizes understanding and agreement

## 2019-07-16 ENCOUNTER — HOSPITAL ENCOUNTER (OUTPATIENT)
Dept: MAMMOGRAPHY | Facility: HOSPITAL | Age: 69
Discharge: HOME | End: 2019-07-16
Attending: PHYSICIAN ASSISTANT
Payer: MEDICAID

## 2019-07-16 ENCOUNTER — HOSPITAL ENCOUNTER (OUTPATIENT)
Dept: ULTRASOUND IMAGING | Facility: HOSPITAL | Age: 69
Discharge: HOME | End: 2019-07-16
Attending: PHYSICIAN ASSISTANT
Payer: MEDICAID

## 2019-07-16 VITALS — SYSTOLIC BLOOD PRESSURE: 126 MMHG | RESPIRATION RATE: 18 BRPM | DIASTOLIC BLOOD PRESSURE: 57 MMHG | HEART RATE: 80 BPM

## 2019-07-16 DIAGNOSIS — N63.10 BREAST MASS, RIGHT: ICD-10-CM

## 2019-07-16 DIAGNOSIS — R92.8 ABNORMAL MAMMOGRAM: ICD-10-CM

## 2019-07-16 PROCEDURE — 88342 IMHCHEM/IMCYTCHM 1ST ANTB: CPT | Performed by: PHYSICIAN ASSISTANT

## 2019-07-16 PROCEDURE — 88305 TISSUE EXAM BY PATHOLOGIST: CPT | Performed by: PHYSICIAN ASSISTANT

## 2019-07-16 PROCEDURE — 88341 IMHCHEM/IMCYTCHM EA ADD ANTB: CPT | Performed by: PHYSICIAN ASSISTANT

## 2019-07-16 PROCEDURE — 19083 BX BREAST 1ST LESION US IMAG: CPT | Performed by: PHYSICIAN ASSISTANT

## 2019-07-16 PROCEDURE — 77065 DX MAMMO INCL CAD UNI: CPT | Performed by: PHYSICIAN ASSISTANT

## 2019-07-16 NOTE — PROCEDURES
[facility] HOSP - [facility]  Procedure Note    Cameron Childers Patient Status:  Outpatient    1950 MRN [id number]   Location [address] [facility] Attending HERMILA Milligan   Hosp Day # 0 PCP Therese Martinez MD     Procedure: Trena Will

## 2019-07-16 NOTE — IMAGING NOTE
hx take and as follows  Mrs. Jadiel Fink is non-English speaking. History obtained and procedure explained with interpretation assist of daughter-in-law Moustapha López. Mrs. Craig's daughter-in-law, Jose Miguel Yip also reported that she resides with her franca HRT use:     No           .

## 2019-07-17 ENCOUNTER — TELEPHONE (OUTPATIENT)
Dept: OTHER | Age: 69
End: 2019-07-17

## 2019-07-17 DIAGNOSIS — D24.1 INTRADUCTAL PAPILLOMA OF RIGHT BREAST: Primary | ICD-10-CM

## 2019-07-17 NOTE — TELEPHONE ENCOUNTER
Pt's daughter-in-law, Dorothyann Crigler (NATE in place), states pt received biopsy results on [place] and wanted to talk to Dr. Joe Izaguirre about those results. Melanie would like call back today, she can be contacted at [phone].

## 2019-07-18 ENCOUNTER — TELEPHONE (OUTPATIENT)
Dept: ULTRASOUND IMAGING | Facility: HOSPITAL | Age: 69
End: 2019-07-18

## 2019-07-18 NOTE — TELEPHONE ENCOUNTER
Called patient and given results of the breast  biopsy. Recommend follow-up with general surgery. Number given for surgery. Spoke with patient as well as daughter-in-law. All questions answered.

## 2019-07-18 NOTE — TELEPHONE ENCOUNTER
Called dtr Lizz Gold confirmed that her mom did receive results and was instructed to follow up with a surgeon dr Marin Fletcher for surgical consult for papilloma. Instructed dtr to call back with any questions or concerns. She denies post procedure c/o from mom.

## 2019-07-19 ENCOUNTER — TELEPHONE (OUTPATIENT)
Dept: MAMMOGRAPHY | Facility: HOSPITAL | Age: 69
End: 2019-07-19

## 2019-07-19 NOTE — TELEPHONE ENCOUNTER
Returned call from dtr in law Mccann received at 200 am . Rahul Landaverde that she has \"palm size brusing to her breast \" area soft not hard and redness at bx site.  Porvided signs and symptoms of infection to breast Ramy denies fever pain or tenderness d

## 2019-07-25 ENCOUNTER — HOSPITAL ENCOUNTER (OUTPATIENT)
Age: 69
Discharge: HOME | End: 2019-07-25
Attending: FAMILY MEDICINE
Payer: MEDICAID

## 2019-07-25 VITALS
BODY MASS INDEX: 25.95 KG/M2 | SYSTOLIC BLOOD PRESSURE: 127 MMHG | HEART RATE: 84 BPM | OXYGEN SATURATION: 99 % | RESPIRATION RATE: 20 BRPM | WEIGHT: 141 LBS | DIASTOLIC BLOOD PRESSURE: 80 MMHG | TEMPERATURE: 99 F | HEIGHT: 62 IN

## 2019-07-25 DIAGNOSIS — N30.01 ACUTE CYSTITIS WITH HEMATURIA: Primary | ICD-10-CM

## 2019-07-25 LAB
BILIRUB UR QL STRIP: NEGATIVE
CLARITY UR: CLEAR
COLOR UR: YELLOW
GLUCOSE UR STRIP-MCNC: NEGATIVE MG/DL
KETONES UR STRIP-MCNC: NEGATIVE MG/DL
NITRITE UR QL STRIP: NEGATIVE
PH UR STRIP: 7.5 [PH]
PROT UR STRIP-MCNC: NEGATIVE MG/DL
SP GR UR STRIP: 1.01
UROBILINOGEN UR STRIP-ACNC: <2 MG/DL

## 2019-07-25 PROCEDURE — 99214 OFFICE O/P EST MOD 30 MIN: CPT

## 2019-07-25 PROCEDURE — 81002 URINALYSIS NONAUTO W/O SCOPE: CPT

## 2019-07-25 PROCEDURE — 87086 URINE CULTURE/COLONY COUNT: CPT | Performed by: FAMILY MEDICINE

## 2019-07-25 RX ORDER — CIPROFLOXACIN 250 MG/1
250 TABLET, FILM COATED ORAL 2 TIMES DAILY
Qty: 14 TABLET | Refills: 0 | Status: SHIPPED | OUTPATIENT
Start: 2019-07-25 | End: 2019-08-01

## 2019-07-25 NOTE — ED PROVIDER NOTES
Patient Seen in: [address] [facility]    History   Patient presents with:  Urinary Symptoms (urologic)    Stated Complaint: UTI    HPI    Patient complains of urinary frequency, urgency and dysuria that began 2 days ago.   Patient d Use      Smoking status: Never Smoker      Smokeless tobacco: Never Used    Alcohol use: No    Drug use: No      Review of Systems    Positive for stated complaint: UTI  Other systems are as noted in HPI. Constitutional and vital signs reviewed.       All

## 2019-07-29 ENCOUNTER — OFFICE VISIT (OUTPATIENT)
Dept: SURGERY | Facility: CLINIC | Age: 69
End: 2019-07-29
Payer: MEDICAID

## 2019-07-29 VITALS
WEIGHT: 141 LBS | SYSTOLIC BLOOD PRESSURE: 140 MMHG | BODY MASS INDEX: 25.95 KG/M2 | DIASTOLIC BLOOD PRESSURE: 80 MMHG | HEIGHT: 62 IN

## 2019-07-29 DIAGNOSIS — D24.1 INTRADUCTAL PAPILLOMA OF BREAST, RIGHT: Primary | ICD-10-CM

## 2019-07-29 PROBLEM — E66.3 OVERWEIGHT: Status: RESOLVED | Noted: 2017-02-15 | Resolved: 2019-07-29

## 2019-07-29 PROBLEM — H25.13 AGE-RELATED NUCLEAR CATARACT OF BOTH EYES: Status: RESOLVED | Noted: 2018-01-25 | Resolved: 2019-07-29

## 2019-07-29 PROBLEM — Z23 NEED FOR INFLUENZA VACCINATION: Status: RESOLVED | Noted: 2017-09-26 | Resolved: 2019-07-29

## 2019-07-29 PROBLEM — K21.9 GASTROESOPHAGEAL REFLUX DISEASE: Status: RESOLVED | Noted: 2018-12-05 | Resolved: 2019-07-29

## 2019-07-29 PROBLEM — K64.8 INTERNAL HEMORRHOIDS WITHOUT COMPLICATION: Status: RESOLVED | Noted: 2018-12-05 | Resolved: 2019-07-29

## 2019-07-29 PROBLEM — A08.4 VIRAL GASTROENTERITIS: Status: RESOLVED | Noted: 2019-04-09 | Resolved: 2019-07-29

## 2019-07-29 PROBLEM — B37.3 VAGINAL CANDIDIASIS: Status: RESOLVED | Noted: 2019-05-02 | Resolved: 2019-07-29

## 2019-07-29 PROBLEM — H40.003 GLAUCOMA SUSPECT OF BOTH EYES: Status: RESOLVED | Noted: 2018-01-25 | Resolved: 2019-07-29

## 2019-07-29 PROBLEM — Z63.6 CAREGIVER STRESS: Status: RESOLVED | Noted: 2017-09-26 | Resolved: 2019-07-29

## 2019-07-29 PROBLEM — R10.13 EPIGASTRIC PAIN: Status: RESOLVED | Noted: 2018-11-06 | Resolved: 2019-07-29

## 2019-07-29 PROBLEM — B37.31 VAGINAL CANDIDIASIS: Status: RESOLVED | Noted: 2019-05-02 | Resolved: 2019-07-29

## 2019-07-29 PROBLEM — K57.30 DIVERTICULOSIS LARGE INTESTINE W/O PERFORATION OR ABSCESS W/O BLEEDING: Status: RESOLVED | Noted: 2018-12-05 | Resolved: 2019-07-29

## 2019-07-29 PROBLEM — R30.0 DYSURIA: Status: RESOLVED | Noted: 2018-11-06 | Resolved: 2019-07-29

## 2019-07-29 PROBLEM — H43.393 FLOATER, VITREOUS, BILATERAL: Status: RESOLVED | Noted: 2018-01-25 | Resolved: 2019-07-29

## 2019-07-29 PROBLEM — E78.00 HYPERCHOLESTEREMIA: Status: RESOLVED | Noted: 2017-02-15 | Resolved: 2019-07-29

## 2019-07-29 PROBLEM — L81.8 POSTINFLAMMATORY HYPOPIGMENTATION: Status: RESOLVED | Noted: 2017-09-26 | Resolved: 2019-07-29

## 2019-07-29 PROBLEM — Z63.79 STRESS DUE TO SPOUSE WITH DEMENTIA: Status: RESOLVED | Noted: 2019-02-13 | Resolved: 2019-07-29

## 2019-07-29 PROCEDURE — 99244 OFF/OP CNSLTJ NEW/EST MOD 40: CPT | Performed by: SURGERY

## 2019-07-29 RX ORDER — METRONIDAZOLE 500 MG/1
500 TABLET ORAL
COMMUNITY
Start: 2019-07-25 | End: 2019-08-05 | Stop reason: ALTCHOICE

## 2019-07-29 NOTE — H&P
Chief complaint: Patient presents with:  Breast Lump: Pt. referred by Dr. Janene Saenz for abnormal BX showing intraductal papilloma of R BR.        HPI: Brandt     Past medical history:   Past Medical History:   Diagnosis Date   • Arthritis    • Toppen 81 topically 2 (two) times daily. Apply every morning and evening.  Disp: 15 g Rfl: 0       Social history:   Social History    Socioeconomic History      Marital status:       Spouse name: Not on file      Number of children: Not on file      Years of wheezing. Breast: Abnormal appearance with large hematoma of R breast and a 2 cm mass of R breast, c/w post bx changes, 6:00 pos at areolar border, no contour changes, no worrisome asymmetry, no skin changes.  Nipple normal appearance bilaterally, no inver Attending No att. providers found   Hosp Day # 0 PCP Therese Martinez MD     Date:  7/29/2019  Date of Admission:  (Not on file)    History provided by:patient and her daughter in law  HPI:   Patient presents with:  Breast Lump: Pt. referred by Dr. Steve Caro Throat  Macrobid [Nitrofura*    RASH, PAIN, Tightness in Chest  Crestor [Rosuvastat*    PAIN    Comment:Abdominal pain  Penicillins             PAIN    Comment:Stomach pain             Cerner Allergy Text Annotation: penicillins;             nausea  Pravas  (H) 03/02/2019    CO2 27.0 03/02/2019    GLU 88 03/02/2019    CA 9.2 03/02/2019    ALB 3.8 03/02/2019    ALKPHO 115 03/02/2019    BILT 0.6 03/02/2019    TP 7.6 03/02/2019    AST 17 03/02/2019    ALT 22 03/02/2019    TSH 1.88 07/23/2018    B12 364

## 2019-07-29 NOTE — H&P (VIEW-ONLY)
Chief complaint: Patient presents with:  Breast Lump: Pt. referred by Dr. Rashaad Paniagua for abnormal BX showing intraductal papilloma of R BR.        HPI: Brandt     Past medical history:   Past Medical History:   Diagnosis Date   • Arthritis    • Toppen 81 topically 2 (two) times daily. Apply every morning and evening.  Disp: 15 g Rfl: 0       Social history:   Social History    Socioeconomic History      Marital status:       Spouse name: Not on file      Number of children: Not on file      Years of wheezing. Breast: Abnormal appearance with large hematoma of R breast and a 2 cm mass of R breast, c/w post bx changes, 6:00 pos at areolar border, no contour changes, no worrisome asymmetry, no skin changes.  Nipple normal appearance bilaterally, no inver Attending No att. providers found   Hosp Day # 0 PCP Therese Martinez MD     Date:  7/29/2019  Date of Admission:  (Not on file)    History provided by:patient and her daughter in law  HPI:   Patient presents with:  Breast Lump: Pt. referred by Dr. Brianna Schneider Throat  Macrobid [Nitrofura*    RASH, PAIN, Tightness in Chest  Crestor [Rosuvastat*    PAIN    Comment:Abdominal pain  Penicillins             PAIN    Comment:Stomach pain             Cerner Allergy Text Annotation: penicillins;             nausea  Pravas  (H) 03/02/2019    CO2 27.0 03/02/2019    GLU 88 03/02/2019    CA 9.2 03/02/2019    ALB 3.8 03/02/2019    ALKPHO 115 03/02/2019    BILT 0.6 03/02/2019    TP 7.6 03/02/2019    AST 17 03/02/2019    ALT 22 03/02/2019    TSH 1.88 07/23/2018    B12 364

## 2019-07-30 ENCOUNTER — NURSE TRIAGE (OUTPATIENT)
Dept: OTHER | Age: 69
End: 2019-07-30

## 2019-07-30 ENCOUNTER — TELEPHONE (OUTPATIENT)
Dept: SURGERY | Facility: CLINIC | Age: 69
End: 2019-07-30

## 2019-07-30 NOTE — TELEPHONE ENCOUNTER
Melanie daughter in law(on hipaa) called indicated that patient went to [facility] ER on 7/25/19 for diverticulitis and was prescribed cipro and flagyl. Patient had a bowel movement on 7/25/19 and had a hard bowel movement yesterday and today.  Denies any black/t

## 2019-07-30 NOTE — TELEPHONE ENCOUNTER
Spoke with Melanie (NATE), advised Dr Neelam Rasheed recommendation, states that they are following low fiber diet as recommended by MD due to diverticulitis, advised to drink prune juice and take OTC Colace stool softener, if there is still no BM after few days ca

## 2019-07-30 NOTE — TELEPHONE ENCOUNTER
Can try stool softener for constipation/ otherwise fiber diet as recommended. Can follow up with Dr Diamond Guadalupe when she returns or as needed if sig symptoms.

## 2019-08-04 ENCOUNTER — LAB ENCOUNTER (OUTPATIENT)
Dept: LAB | Facility: HOSPITAL | Age: 69
End: 2019-08-04
Attending: SURGERY
Payer: MEDICAID

## 2019-08-04 DIAGNOSIS — D24.1 INTRADUCTAL PAPILLOMA OF BREAST, RIGHT: ICD-10-CM

## 2019-08-04 LAB
ANION GAP SERPL CALC-SCNC: 6 MMOL/L (ref 0–18)
BASOPHILS # BLD AUTO: 0.04 X10(3) UL (ref 0–0.2)
BASOPHILS NFR BLD AUTO: 0.7 %
BUN BLD-MCNC: 11 MG/DL (ref 7–18)
BUN/CREAT SERPL: 15.7 (ref 10–20)
CALCIUM BLD-MCNC: 9.3 MG/DL (ref 8.5–10.1)
CHLORIDE SERPL-SCNC: 105 MMOL/L (ref 98–112)
CO2 SERPL-SCNC: 27 MMOL/L (ref 21–32)
CREAT BLD-MCNC: 0.7 MG/DL (ref 0.55–1.02)
DEPRECATED RDW RBC AUTO: 43.2 FL (ref 35.1–46.3)
EOSINOPHIL # BLD AUTO: 0.13 X10(3) UL (ref 0–0.7)
EOSINOPHIL NFR BLD AUTO: 2.3 %
ERYTHROCYTE [DISTWIDTH] IN BLOOD BY AUTOMATED COUNT: 13.9 % (ref 11–15)
GLUCOSE BLD-MCNC: 96 MG/DL (ref 70–99)
HCT VFR BLD AUTO: 40.3 % (ref 35–48)
HGB BLD-MCNC: 13.6 G/DL (ref 12–16)
IMM GRANULOCYTES # BLD AUTO: 0.01 X10(3) UL (ref 0–1)
IMM GRANULOCYTES NFR BLD: 0.2 %
LYMPHOCYTES # BLD AUTO: 1.67 X10(3) UL (ref 1–4)
LYMPHOCYTES NFR BLD AUTO: 29.3 %
MCH RBC QN AUTO: 28.8 PG (ref 26–34)
MCHC RBC AUTO-ENTMCNC: 33.7 G/DL (ref 31–37)
MCV RBC AUTO: 85.2 FL (ref 80–100)
MONOCYTES # BLD AUTO: 0.61 X10(3) UL (ref 0.1–1)
MONOCYTES NFR BLD AUTO: 10.7 %
NEUTROPHILS # BLD AUTO: 3.24 X10 (3) UL (ref 1.5–7.7)
NEUTROPHILS # BLD AUTO: 3.24 X10(3) UL (ref 1.5–7.7)
NEUTROPHILS NFR BLD AUTO: 56.8 %
OSMOLALITY SERPL CALC.SUM OF ELEC: 285 MOSM/KG (ref 275–295)
PATIENT FASTING: YES
PLATELET # BLD AUTO: 280 10(3)UL (ref 150–450)
POTASSIUM SERPL-SCNC: 3.8 MMOL/L (ref 3.5–5.1)
RBC # BLD AUTO: 4.73 X10(6)UL (ref 3.8–5.3)
SODIUM SERPL-SCNC: 138 MMOL/L (ref 136–145)
WBC # BLD AUTO: 5.7 X10(3) UL (ref 4–11)

## 2019-08-04 PROCEDURE — 85025 COMPLETE CBC W/AUTO DIFF WBC: CPT

## 2019-08-04 PROCEDURE — 82652 VIT D 1 25-DIHYDROXY: CPT

## 2019-08-04 PROCEDURE — 80048 BASIC METABOLIC PNL TOTAL CA: CPT

## 2019-08-04 PROCEDURE — 36415 COLL VENOUS BLD VENIPUNCTURE: CPT

## 2019-08-05 ENCOUNTER — OFFICE VISIT (OUTPATIENT)
Dept: FAMILY MEDICINE CLINIC | Facility: CLINIC | Age: 69
End: 2019-08-05
Payer: MEDICAID

## 2019-08-05 VITALS
BODY MASS INDEX: 26.13 KG/M2 | DIASTOLIC BLOOD PRESSURE: 77 MMHG | SYSTOLIC BLOOD PRESSURE: 122 MMHG | TEMPERATURE: 98 F | HEART RATE: 79 BPM | WEIGHT: 142 LBS | HEIGHT: 62 IN

## 2019-08-05 DIAGNOSIS — K57.92 DIVERTICULITIS: Primary | ICD-10-CM

## 2019-08-05 PROCEDURE — 99213 OFFICE O/P EST LOW 20 MIN: CPT | Performed by: FAMILY MEDICINE

## 2019-08-05 NOTE — PROGRESS NOTES
HPI:    Patient ID: Angela Heller is a 78year old female. HPI  Patient presents with:  ER F/U: for Diverticulitis pt states she still feels weak    went to [facility] 7/25/19, had diverticulitis.   Had CT scan there and discharged on antibiotics, ciprofloxaci rate, regular rhythm and normal heart sounds. Pulmonary/Chest: Effort normal.   Abdominal: Soft. Bowel sounds are normal.              ASSESSMENT/PLAN:   Diverticulitis  (primary encounter diagnosis)    1. Diverticulitis  Reviewed high fiber diet.   colon

## 2019-08-05 NOTE — PATIENT INSTRUCTIONS
Diverticulitis    Some people get pouches along the wall of the colon as they get older. The pouches, called diverticuli, usually cause no symptoms. If the pouches become blocked, you can get an infection. This infection is called diverticulitis.  It caus Once you have an episode of diverticulitis, you are at risk for having it again. After you have recovered from this episode, you may be able to lower your risk by eating a high-fiber diet (20 gm/day to 35 gm/day of fiber).  This cleans out the colon pouches © 7595-0930 [employer]. [address] [facility], [address]. All rights reserved. This information is not intended as a substitute for professional medical care. Always follow your healthcare professional's instructions.       ˜ª áæ È Ç?˜ ÈÇÑ ÌÈ Â ˜æ ÇÆ? æÑŠ? ˜æáÇÆŠÓ ˜Ç æÇÞÚÀ ãá ÌÇÊÇ Àÿ ¡ Êæ Â ˜æ ÇÓ ˜ÿ ÏæÈÇÑÀ Àæäÿ ˜Ç ÎØÑÀ ÀæÊÇ Àÿ? ÇÓ æÇÞÚÿ Óÿ ÕÍÊ ? ÇÈ Àæäÿ ˜ÿ ÈÚÏ ¡ Â ÇÚá? ÝÇÆÈÑ ÛÐÇ (20 ÑÇã / Ïä Ý? Ïä 35 ÑÇã Ý? Ïä ÝÇÆÈÑ) ˜ªÇ ˜Ñ ÇäÇ ÎØÑÀ ˜ã ˜ÑÓ˜Êÿ À? Ÿ? Chetna Zhou Èš?  ÂäÊ ˜ÿ ÇÄÓ ÕÇÝ ÀæÌÇÊ

## 2019-08-06 LAB — 1,25-DIHYDROXYVITAMIN D: 57.5 PG/ML

## 2019-08-13 ENCOUNTER — TELEPHONE (OUTPATIENT)
Dept: GASTROENTEROLOGY | Facility: CLINIC | Age: 69
End: 2019-08-13

## 2019-08-13 ENCOUNTER — OFFICE VISIT (OUTPATIENT)
Dept: GASTROENTEROLOGY | Facility: CLINIC | Age: 69
End: 2019-08-13
Payer: MEDICAID

## 2019-08-13 VITALS
HEART RATE: 68 BPM | BODY MASS INDEX: 25.76 KG/M2 | SYSTOLIC BLOOD PRESSURE: 124 MMHG | WEIGHT: 140 LBS | HEIGHT: 62 IN | DIASTOLIC BLOOD PRESSURE: 80 MMHG

## 2019-08-13 DIAGNOSIS — Z87.19 HISTORY OF DIVERTICULITIS OF COLON: Primary | ICD-10-CM

## 2019-08-13 DIAGNOSIS — K21.9 GASTROESOPHAGEAL REFLUX DISEASE WITHOUT ESOPHAGITIS: ICD-10-CM

## 2019-08-13 DIAGNOSIS — K62.89 RECTAL PAIN: ICD-10-CM

## 2019-08-13 DIAGNOSIS — K59.00 CONSTIPATION, UNSPECIFIED CONSTIPATION TYPE: ICD-10-CM

## 2019-08-13 PROCEDURE — 99214 OFFICE O/P EST MOD 30 MIN: CPT | Performed by: INTERNAL MEDICINE

## 2019-08-13 NOTE — PATIENT INSTRUCTIONS
Reflux:   Avoid symptoms and triggers.    Reviewed anti-reflux measures such as raising the head of the bed at night, avoiding tight clothing or belts, avoiding eating late at night and not lying down shortly after mealtime and achieving weight loss were [facility]

## 2019-08-13 NOTE — H&P
[facility] - Gastroenterology  Clinic Progress note    Patient presents with:  Consult: has seen Dianne Brittle in past; recently was in ER for diverticulitis; finished antibiotics; has had some discomfort since ER visit      HPI:   Sosa Devine is a 78 ye degeneration Neg       Social History: Social History    Tobacco Use      Smoking status: Never Smoker      Smokeless tobacco: Never Used    Alcohol use: No    Drug use: No       Medications (Active prior to today's visit):    Current Outpatient Medication masses are palpated  Skin: No jaundice  Ext: no cyanosis, clubbing or edema is evident.    Neuro: Alert and interactive, and gross movements of extremities normal  Rectal: small external hemorrhoid, not thrombosed, normal rectal exam with normal tone    Lab constipation likely irritated hemorrhoid    Recommend:  Reflux:   Avoid symptoms and triggers.    Reviewed anti-reflux measures such as raising the head of the bed at night, avoiding tight clothing or belts, avoiding eating late at night and not lying down

## 2019-08-13 NOTE — TELEPHONE ENCOUNTER
Dr Ken Ayala, pt's daughter in law called back requesting a prescription for the hydrocortisone 2.5% cream. Please e prescribe to the verified pharmacy on file

## 2019-08-15 ENCOUNTER — ANESTHESIA EVENT (OUTPATIENT)
Dept: SURGERY | Facility: HOSPITAL | Age: 69
End: 2019-08-15
Payer: MEDICAID

## 2019-08-15 ENCOUNTER — HOSPITAL ENCOUNTER (OUTPATIENT)
Facility: HOSPITAL | Age: 69
Setting detail: HOSPITAL OUTPATIENT SURGERY
Discharge: HOME | End: 2019-08-15
Attending: SURGERY | Admitting: SURGERY
Payer: MEDICAID

## 2019-08-15 ENCOUNTER — HOSPITAL ENCOUNTER (OUTPATIENT)
Dept: ULTRASOUND IMAGING | Facility: HOSPITAL | Age: 69
Discharge: HOME | End: 2019-08-15
Attending: SURGERY
Payer: MEDICAID

## 2019-08-15 ENCOUNTER — APPOINTMENT (OUTPATIENT)
Dept: MAMMOGRAPHY | Facility: HOSPITAL | Age: 69
End: 2019-08-15
Attending: SURGERY
Payer: MEDICAID

## 2019-08-15 ENCOUNTER — ANESTHESIA (OUTPATIENT)
Dept: SURGERY | Facility: HOSPITAL | Age: 69
End: 2019-08-15
Payer: MEDICAID

## 2019-08-15 ENCOUNTER — HOSPITAL ENCOUNTER (OUTPATIENT)
Dept: MAMMOGRAPHY | Facility: HOSPITAL | Age: 69
Discharge: HOME | End: 2019-08-15
Attending: SURGERY
Payer: MEDICAID

## 2019-08-15 VITALS
HEIGHT: 62 IN | WEIGHT: 142 LBS | TEMPERATURE: 98 F | OXYGEN SATURATION: 96 % | SYSTOLIC BLOOD PRESSURE: 135 MMHG | DIASTOLIC BLOOD PRESSURE: 73 MMHG | HEART RATE: 64 BPM | BODY MASS INDEX: 26.13 KG/M2 | RESPIRATION RATE: 14 BRPM

## 2019-08-15 DIAGNOSIS — D24.1 INTRADUCTAL PAPILLOMA OF BREAST, RIGHT: ICD-10-CM

## 2019-08-15 PROCEDURE — 19125 EXCISION BREAST LESION: CPT | Performed by: SURGERY

## 2019-08-15 PROCEDURE — 0HBT0ZX EXCISION OF RIGHT BREAST, OPEN APPROACH, DIAGNOSTIC: ICD-10-PCS | Performed by: SURGERY

## 2019-08-15 PROCEDURE — 76098 X-RAY EXAM SURGICAL SPECIMEN: CPT | Performed by: SURGERY

## 2019-08-15 PROCEDURE — 19285 PERQ DEV BREAST 1ST US IMAG: CPT | Performed by: SURGERY

## 2019-08-15 PROCEDURE — BH00ZZZ PLAIN RADIOGRAPHY OF RIGHT BREAST: ICD-10-PCS | Performed by: SURGERY

## 2019-08-15 PROCEDURE — 77065 DX MAMMO INCL CAD UNI: CPT | Performed by: SURGERY

## 2019-08-15 RX ORDER — HYDROCODONE BITARTRATE AND ACETAMINOPHEN 5; 325 MG/1; MG/1
1 TABLET ORAL EVERY 6 HOURS PRN
Qty: 12 TABLET | Refills: 0 | Status: SHIPPED | OUTPATIENT
Start: 2019-08-15 | End: 2020-03-16

## 2019-08-15 RX ORDER — HYDROCODONE BITARTRATE AND ACETAMINOPHEN 5; 325 MG/1; MG/1
2 TABLET ORAL AS NEEDED
Status: DISCONTINUED | OUTPATIENT
Start: 2019-08-15 | End: 2019-08-15

## 2019-08-15 RX ORDER — MIDAZOLAM HYDROCHLORIDE 1 MG/ML
INJECTION INTRAMUSCULAR; INTRAVENOUS AS NEEDED
Status: DISCONTINUED | OUTPATIENT
Start: 2019-08-15 | End: 2019-08-15 | Stop reason: SURG

## 2019-08-15 RX ORDER — LIDOCAINE HYDROCHLORIDE 10 MG/ML
INJECTION, SOLUTION EPIDURAL; INFILTRATION; INTRACAUDAL; PERINEURAL AS NEEDED
Status: DISCONTINUED | OUTPATIENT
Start: 2019-08-15 | End: 2019-08-15 | Stop reason: SURG

## 2019-08-15 RX ORDER — BUPIVACAINE HYDROCHLORIDE AND EPINEPHRINE 2.5; 5 MG/ML; UG/ML
INJECTION, SOLUTION INFILTRATION; PERINEURAL AS NEEDED
Status: DISCONTINUED | OUTPATIENT
Start: 2019-08-15 | End: 2019-08-15 | Stop reason: HOSPADM

## 2019-08-15 RX ORDER — MORPHINE SULFATE 10 MG/ML
6 INJECTION, SOLUTION INTRAMUSCULAR; INTRAVENOUS EVERY 10 MIN PRN
Status: DISCONTINUED | OUTPATIENT
Start: 2019-08-15 | End: 2019-08-15

## 2019-08-15 RX ORDER — ONDANSETRON 2 MG/ML
INJECTION INTRAMUSCULAR; INTRAVENOUS AS NEEDED
Status: DISCONTINUED | OUTPATIENT
Start: 2019-08-15 | End: 2019-08-15 | Stop reason: SURG

## 2019-08-15 RX ORDER — SODIUM CHLORIDE, SODIUM LACTATE, POTASSIUM CHLORIDE, CALCIUM CHLORIDE 600; 310; 30; 20 MG/100ML; MG/100ML; MG/100ML; MG/100ML
INJECTION, SOLUTION INTRAVENOUS CONTINUOUS
Status: DISCONTINUED | OUTPATIENT
Start: 2019-08-15 | End: 2019-08-15

## 2019-08-15 RX ORDER — HYDROMORPHONE HYDROCHLORIDE 1 MG/ML
0.4 INJECTION, SOLUTION INTRAMUSCULAR; INTRAVENOUS; SUBCUTANEOUS EVERY 5 MIN PRN
Status: DISCONTINUED | OUTPATIENT
Start: 2019-08-15 | End: 2019-08-15

## 2019-08-15 RX ORDER — DEXAMETHASONE SODIUM PHOSPHATE 4 MG/ML
VIAL (ML) INJECTION AS NEEDED
Status: DISCONTINUED | OUTPATIENT
Start: 2019-08-15 | End: 2019-08-15 | Stop reason: SURG

## 2019-08-15 RX ORDER — ROCURONIUM BROMIDE 10 MG/ML
INJECTION, SOLUTION INTRAVENOUS AS NEEDED
Status: DISCONTINUED | OUTPATIENT
Start: 2019-08-15 | End: 2019-08-15 | Stop reason: SURG

## 2019-08-15 RX ORDER — HYDROMORPHONE HYDROCHLORIDE 1 MG/ML
0.2 INJECTION, SOLUTION INTRAMUSCULAR; INTRAVENOUS; SUBCUTANEOUS EVERY 5 MIN PRN
Status: DISCONTINUED | OUTPATIENT
Start: 2019-08-15 | End: 2019-08-15

## 2019-08-15 RX ORDER — ACETAMINOPHEN 500 MG
1000 TABLET ORAL ONCE
Status: COMPLETED | OUTPATIENT
Start: 2019-08-15 | End: 2019-08-15

## 2019-08-15 RX ORDER — HYDROCODONE BITARTRATE AND ACETAMINOPHEN 5; 325 MG/1; MG/1
1 TABLET ORAL AS NEEDED
Status: DISCONTINUED | OUTPATIENT
Start: 2019-08-15 | End: 2019-08-15

## 2019-08-15 RX ORDER — CEFAZOLIN SODIUM/WATER 2 G/20 ML
SYRINGE (ML) INTRAVENOUS AS NEEDED
Status: DISCONTINUED | OUTPATIENT
Start: 2019-08-15 | End: 2019-08-15 | Stop reason: SURG

## 2019-08-15 RX ORDER — MORPHINE SULFATE 4 MG/ML
4 INJECTION, SOLUTION INTRAMUSCULAR; INTRAVENOUS EVERY 10 MIN PRN
Status: DISCONTINUED | OUTPATIENT
Start: 2019-08-15 | End: 2019-08-15

## 2019-08-15 RX ORDER — GLYCOPYRROLATE 0.2 MG/ML
INJECTION INTRAMUSCULAR; INTRAVENOUS AS NEEDED
Status: DISCONTINUED | OUTPATIENT
Start: 2019-08-15 | End: 2019-08-15 | Stop reason: SURG

## 2019-08-15 RX ORDER — MORPHINE SULFATE 2 MG/ML
2 INJECTION, SOLUTION INTRAMUSCULAR; INTRAVENOUS EVERY 10 MIN PRN
Status: DISCONTINUED | OUTPATIENT
Start: 2019-08-15 | End: 2019-08-15

## 2019-08-15 RX ORDER — FAMOTIDINE 20 MG/1
20 TABLET ORAL ONCE
Status: DISCONTINUED | OUTPATIENT
Start: 2019-08-15 | End: 2019-08-15 | Stop reason: HOSPADM

## 2019-08-15 RX ORDER — HYDROMORPHONE HYDROCHLORIDE 1 MG/ML
INJECTION, SOLUTION INTRAMUSCULAR; INTRAVENOUS; SUBCUTANEOUS AS NEEDED
Status: DISCONTINUED | OUTPATIENT
Start: 2019-08-15 | End: 2019-08-15 | Stop reason: SURG

## 2019-08-15 RX ORDER — NALOXONE HYDROCHLORIDE 0.4 MG/ML
80 INJECTION, SOLUTION INTRAMUSCULAR; INTRAVENOUS; SUBCUTANEOUS AS NEEDED
Status: DISCONTINUED | OUTPATIENT
Start: 2019-08-15 | End: 2019-08-15

## 2019-08-15 RX ORDER — ONDANSETRON 2 MG/ML
4 INJECTION INTRAMUSCULAR; INTRAVENOUS ONCE AS NEEDED
Status: DISCONTINUED | OUTPATIENT
Start: 2019-08-15 | End: 2019-08-15

## 2019-08-15 RX ORDER — METOCLOPRAMIDE 10 MG/1
10 TABLET ORAL ONCE
Status: DISCONTINUED | OUTPATIENT
Start: 2019-08-15 | End: 2019-08-15 | Stop reason: HOSPADM

## 2019-08-15 RX ORDER — NEOSTIGMINE METHYLSULFATE 0.5 MG/ML
INJECTION INTRAVENOUS AS NEEDED
Status: DISCONTINUED | OUTPATIENT
Start: 2019-08-15 | End: 2019-08-15 | Stop reason: SURG

## 2019-08-15 RX ORDER — HYDROMORPHONE HYDROCHLORIDE 1 MG/ML
0.6 INJECTION, SOLUTION INTRAMUSCULAR; INTRAVENOUS; SUBCUTANEOUS EVERY 5 MIN PRN
Status: DISCONTINUED | OUTPATIENT
Start: 2019-08-15 | End: 2019-08-15

## 2019-08-15 RX ADMIN — HYDROMORPHONE HYDROCHLORIDE 0.5 MG: 1 INJECTION, SOLUTION INTRAMUSCULAR; INTRAVENOUS; SUBCUTANEOUS at 09:22:00

## 2019-08-15 RX ADMIN — CEFAZOLIN SODIUM/WATER 2 G: 2 G/20 ML SYRINGE (ML) INTRAVENOUS at 09:33:00

## 2019-08-15 RX ADMIN — LIDOCAINE HYDROCHLORIDE 50 MG: 10 INJECTION, SOLUTION EPIDURAL; INFILTRATION; INTRACAUDAL; PERINEURAL at 09:27:00

## 2019-08-15 RX ADMIN — ONDANSETRON 4 MG: 2 INJECTION INTRAMUSCULAR; INTRAVENOUS at 10:14:00

## 2019-08-15 RX ADMIN — GLYCOPYRROLATE 0.4 MG: 0.2 INJECTION INTRAMUSCULAR; INTRAVENOUS at 10:14:00

## 2019-08-15 RX ADMIN — ROCURONIUM BROMIDE 30 MG: 10 INJECTION, SOLUTION INTRAVENOUS at 09:27:00

## 2019-08-15 RX ADMIN — NEOSTIGMINE METHYLSULFATE 3 MG: 0.5 INJECTION INTRAVENOUS at 10:14:00

## 2019-08-15 RX ADMIN — MIDAZOLAM HYDROCHLORIDE 2 MG: 1 INJECTION INTRAMUSCULAR; INTRAVENOUS at 09:17:00

## 2019-08-15 RX ADMIN — SODIUM CHLORIDE, SODIUM LACTATE, POTASSIUM CHLORIDE, CALCIUM CHLORIDE: 600; 310; 30; 20 INJECTION, SOLUTION INTRAVENOUS at 10:23:00

## 2019-08-15 RX ADMIN — DEXAMETHASONE SODIUM PHOSPHATE 4 MG: 4 MG/ML VIAL (ML) INJECTION at 09:37:00

## 2019-08-15 NOTE — ANESTHESIA PREPROCEDURE EVALUATION
Anesthesia PreOp Note    HPI:     Devora Pinedo is a 78year old female who presents for preoperative consultation requested by: Kashif Aranda MD    Date of Surgery: 8/15/2019    Procedure(s):  BREAST LUMPECTOMY  BREAST BIOPSY NEEDLE LOCALIZATION  In Last Rate: 20 mL/hr at 08/15/19 0635   famoTIDine (PEPCID) tab 20 mg 20 mg Oral Once Margie Sweeney MD    Metoclopramide HCl (REGLAN) tab 10 mg 10 mg Oral Once Margie Sweeney MD      No current [facility]-ordered outpatient medications on file.       Bact partner violence:        Fear of current or ex partner: Not on file        Emotionally abused: Not on file        Physically abused: Not on file        Forced sexual activity: Not on file    Other Topics      Concerns:        Not on file    Social History  and/or legal guardian or family member of the nature of the anesthetic plan, benefits, risks including possible dental damage if relevant, major complications, and any alternative forms of anesthetic management.    All of the patient's questions wer

## 2019-08-15 NOTE — ANESTHESIA POSTPROCEDURE EVALUATION
Patient: Ty Ghotra    Procedure Summary     Date:  08/15/19 Room / Location:  [address] [place] [address] MAIN OR 06 / [address] [facility] MAIN OR    Anesthesia Start:  9633 Anesthesia Stop:  1810    Procedures:       BREAST LUMPECTOMY (Right )      BREAST BIOPSY NEEDLE LOCALIZATION (Right

## 2019-08-15 NOTE — PROCEDURES
[facility] HOSP - [facility]  Procedure Note    Nickolas Virk Patient Status:  Outpatient    1950 MRN [id number]   Location [place] [address] Attending Awais Oakley MD   Hosp Day # 0 PCP Therese Martinez MD     Procedure: Ultrasound gu

## 2019-08-15 NOTE — ANESTHESIA PROCEDURE NOTES
Airway  Date/Time: 8/15/2019 9:30 AM  Urgency: elective    Airway not difficult    General Information and Staff    Patient location during procedure: OR  Anesthesiologist: Sharon Orourke MD  Resident/CRNA: Heather Conklin., CRNA  Performed: CRNA

## 2019-08-15 NOTE — IMAGING NOTE
Pt  To ultrasound /mammography department scouts completed by MILES haas /    0688 Hx taken procedure explained questions answered. Order verified and initialed by all staff members .   Consent signed and verified at  Consent signed and Lyubov Staff

## 2019-08-15 NOTE — INTERVAL H&P NOTE
Pre-op Diagnosis: Intraductal papilloma of breast, right [D24.1]    The above referenced H&P was reviewed by Jenny Lacy MD on 8/15/2019, the patient was examined and no significant changes have occurred in the patient's condition since the H&P was per

## 2019-08-15 NOTE — OPERATIVE REPORT
[facility]   Op Note    [facility] [id number] MRN [id number]   Admission Date 8/15/2019 Operation Date 8/15/2019   Attending Physician Igor Monreal MD Operating Physician Cathie Oneal MD     Date of procedure: inferior areolar border. The excisional biopsy was performed, excising an adequate mass of breast tissue around the needle localization. Electrocautery was used to assure hemostasis.   An intraoperative specimen mammogram was obtained, the mammographic abn

## 2019-08-26 ENCOUNTER — TELEPHONE (OUTPATIENT)
Dept: OBGYN CLINIC | Facility: CLINIC | Age: 69
End: 2019-08-26

## 2019-08-26 NOTE — TELEPHONE ENCOUNTER
Pt daughter is calling said when scheduled the ultrasound they said a prio auth is required , She scheduled sept , 3

## 2019-08-29 ENCOUNTER — OFFICE VISIT (OUTPATIENT)
Dept: SURGERY | Facility: CLINIC | Age: 69
End: 2019-08-29
Payer: MEDICAID

## 2019-08-29 VITALS — WEIGHT: 141 LBS | HEIGHT: 60 IN | BODY MASS INDEX: 27.68 KG/M2

## 2019-08-29 DIAGNOSIS — D24.9 PAPILLOMA OF BREAST, UNSPECIFIED LATERALITY: Primary | ICD-10-CM

## 2019-08-29 PROCEDURE — 99024 POSTOP FOLLOW-UP VISIT: CPT | Performed by: SURGERY

## 2019-08-29 NOTE — PROGRESS NOTES
S:  Jihan Barr is a 78year old female sp excisional breast bx for papilloma  Doing well, tolerating a general diet, normal bowel movements, no calf tenderness nor lower extremity edema, no shortness of breath, no chest pain.        O:  Ht 5' (1.524 m)

## 2019-09-02 ENCOUNTER — TELEPHONE (OUTPATIENT)
Dept: GASTROENTEROLOGY | Facility: CLINIC | Age: 69
End: 2019-09-02

## 2019-09-02 DIAGNOSIS — R19.7 DIARRHEA, UNSPECIFIED TYPE: Primary | ICD-10-CM

## 2019-09-02 NOTE — TELEPHONE ENCOUNTER
ON CALL NOTE:    Paged by patient. Her daughter in law answered telling me the patient has had diarrhea, non-bloody, several times yesterday and none today. Has some abdominal pain/discomfort. Thinks it is getting better. Denies chronic abdominal pain.  Chi Herring

## 2019-09-04 NOTE — TELEPHONE ENCOUNTER
Not eating well  Very uncomfortable after she eats but was better yesterday and today again with pain. Has loose stools in the middle of the night and then 5 am today, but more formed than Sunday (diarrhea started Sunday).  Now becoming more formed but the

## 2019-09-11 ENCOUNTER — HOSPITAL ENCOUNTER (OUTPATIENT)
Dept: ULTRASOUND IMAGING | Facility: HOSPITAL | Age: 69
Discharge: HOME | End: 2019-09-11
Attending: OBSTETRICS & GYNECOLOGY
Payer: MEDICAID

## 2019-09-11 DIAGNOSIS — N95.0 PMB (POSTMENOPAUSAL BLEEDING): ICD-10-CM

## 2019-09-11 DIAGNOSIS — R93.89 THICKENED ENDOMETRIUM: ICD-10-CM

## 2019-09-11 PROCEDURE — 76830 TRANSVAGINAL US NON-OB: CPT | Performed by: OBSTETRICS & GYNECOLOGY

## 2019-09-11 PROCEDURE — 76856 US EXAM PELVIC COMPLETE: CPT | Performed by: OBSTETRICS & GYNECOLOGY

## 2019-09-17 ENCOUNTER — TELEPHONE (OUTPATIENT)
Dept: OBGYN CLINIC | Facility: CLINIC | Age: 69
End: 2019-09-17

## 2019-09-17 NOTE — TELEPHONE ENCOUNTER
Informed pt daughter in law of [facility] recs below. Pt daughter in law states pt can not tolerate Ibuprofen or Aleve due to pt having stomach pains. Pt daughter in law sates pt can tolerate Tylenol. Informed pt message will be sent to [facility]. Pt daughter in law verbalized understanding.

## 2019-09-17 NOTE — TELEPHONE ENCOUNTER
PT'S DAUGHTER NOTIFIED OF RESULTS AND RECS. STATES PT HAS VERY LOW PAIN TOLERANCE, HAD DIFFICULTY WITH TRANSVAGINAL ULTRASOUND. SHE DOES NOT BELIEVE PT HAS EVER BEEN PRE-MEDICATED FOR PROCEDURES IN THE PAST. DAUGHTER WANTED TO KNOW IF [facility] WILL STOP PROCEDURE IF PT CANNOT TOLERATE IT? DAUGHTER ALSO NOTIFIED WE WILL HAVE TO CHECK THE SCHEDULE FOR STAFF BEFORE BOOKING THE APPT SO WILL CALL HER BACK. MESSAGE TO JAYLYN RE: PAIN MEDS FOR PROC? MESSAGE TO LEONEL RE: ENDOSEE APPT?

## 2019-09-19 NOTE — TELEPHONE ENCOUNTER
Spoke with pts daughter to inform her that we have an opening for the Endosee procedure on 9/26/19 at 4pm. Pts daughter accepted appt. Informed daughter that pt should take 2 extra strength Tylenol 1 hr prior to procedure.

## 2019-09-26 ENCOUNTER — OFFICE VISIT (OUTPATIENT)
Dept: OBGYN CLINIC | Facility: CLINIC | Age: 69
End: 2019-09-26
Payer: MEDICAID

## 2019-09-26 VITALS
WEIGHT: 139.38 LBS | DIASTOLIC BLOOD PRESSURE: 86 MMHG | HEART RATE: 78 BPM | BODY MASS INDEX: 27 KG/M2 | SYSTOLIC BLOOD PRESSURE: 146 MMHG

## 2019-09-26 DIAGNOSIS — R93.89 INCREASED ENDOMETRIAL STRIPE THICKNESS: Primary | ICD-10-CM

## 2019-09-26 PROCEDURE — 58555 HYSTEROSCOPY DX SEP PROC: CPT | Performed by: OBSTETRICS & GYNECOLOGY

## 2019-09-27 ENCOUNTER — TELEPHONE (OUTPATIENT)
Dept: OBGYN CLINIC | Facility: CLINIC | Age: 69
End: 2019-09-27

## 2019-09-27 NOTE — TELEPHONE ENCOUNTER
Please schedule the following surgery once medical clearance obtained:    Procedure: operative hysteroscopic currettage, possible polypectomy    Date: once medical clearance    Diagnosis: thickened endometrial stripe, cervical stenosis    Admission:Day chu

## 2019-09-27 NOTE — PROCEDURES
Endosee Procedure     LMP: n/a  Pregnancy Results: n/a  Birth control method(s) used:     Consent signed. Procedure discussed with the patient in detail including indication, risks, benefits, alternatives and complications.     Indication:  thickened

## 2019-09-29 ENCOUNTER — PATIENT MESSAGE (OUTPATIENT)
Dept: OBGYN CLINIC | Facility: CLINIC | Age: 69
End: 2019-09-29

## 2019-09-30 ENCOUNTER — TELEPHONE (OUTPATIENT)
Dept: OTHER | Age: 69
End: 2019-09-30

## 2019-09-30 NOTE — TELEPHONE ENCOUNTER
Pt's daughter in law requesting to speak with surgery scheduler again, states she has additional questions

## 2019-09-30 NOTE — TELEPHONE ENCOUNTER
ATTEMPTED TO EXPLAIN BUT PT ASKED THAT I CALL BACK IN 20 MINUTES TO EXPLAIN THIS TO HER DAUGHTER-IN-LAW.

## 2019-09-30 NOTE — TELEPHONE ENCOUNTER
She needs a preop visit  And will order labs, ekg, cxr 2-3 weeks before surgery  When is surgery date?

## 2019-09-30 NOTE — TELEPHONE ENCOUNTER
EXPLAINED TO PT'S DAUGHTER-IN-LAW THAT THIS SURGERY IS ALWAYS DONE UNDER GENERAL NOT LOCAL ANESTHESIA.   BUT ALSO ADVISED THE ANESTHESIOLOGIST WILL COME IN TO TALK WITH HER RIGHT BEFORE SURGERY AND SHE CAN DISCUSS HER CONCERNS AND THEY WILL LET HER KNOW [address] [place] [address]

## 2019-09-30 NOTE — TELEPHONE ENCOUNTER
Spoke to pt and daughter in law and advised that I just wanted to make sure they were aware to call us to once medical clearance was obtained to initiate scheduling the procedure.

## 2019-09-30 NOTE — TELEPHONE ENCOUNTER
Patients daughter in law Lennox Conley calling requesting medical clearance for patient required by Dr. Usha Alvarez office to do a [facility]. Please advise.     Please respond to pool: EM [facility] LPN/CRISTINO

## 2019-09-30 NOTE — TELEPHONE ENCOUNTER
From: Joycie Bloch  To: Kalie Dubon MD  Sent: 9/29/2019 3:52 PM CDT  Subject: Test Results Question    I was looking over the office visit summary. It has Opioid education on it. Was is suppose to be prescribed?   Joycie Bloch

## 2019-10-01 ENCOUNTER — TELEPHONE (OUTPATIENT)
Dept: FAMILY MEDICINE CLINIC | Facility: CLINIC | Age: 69
End: 2019-10-01

## 2019-10-01 NOTE — TELEPHONE ENCOUNTER
Criselda, Care Coordinator/[employer] called in stating that patient was admitted on 8/15 due to diverticulitis flare up. Jailyn Ford wanted to make sure Dr. Sonny Miranda was aware.

## 2019-10-01 NOTE — TELEPHONE ENCOUNTER
Called pt and spoke with Spoke with pt's daughter in  law Beatriz Veliz told her that she has to come in for a pre-op

## 2019-10-02 NOTE — TELEPHONE ENCOUNTER
Patient was seen 8/5 after [facility] emergency room for diverticulitis  She was admitted 8/15 for something else.  (surgery for breast papilloma)

## 2019-10-06 ENCOUNTER — WALK IN (OUTPATIENT)
Dept: URGENT CARE | Age: 69
End: 2019-10-06

## 2019-10-06 VITALS
HEART RATE: 98 BPM | TEMPERATURE: 98.2 F | BODY MASS INDEX: 25.46 KG/M2 | HEIGHT: 62 IN | WEIGHT: 138.34 LBS | OXYGEN SATURATION: 97 %

## 2019-10-06 DIAGNOSIS — J01.10 ACUTE NON-RECURRENT FRONTAL SINUSITIS: Primary | ICD-10-CM

## 2019-10-06 PROCEDURE — 99203 OFFICE O/P NEW LOW 30 MIN: CPT | Performed by: NURSE PRACTITIONER

## 2019-10-06 RX ORDER — AMOXICILLIN AND CLAVULANATE POTASSIUM 875; 125 MG/1; MG/1
1 TABLET, FILM COATED ORAL 2 TIMES DAILY
Qty: 20 TABLET | Refills: 0 | Status: SHIPPED | OUTPATIENT
Start: 2019-10-06 | End: 2019-10-16

## 2019-10-06 RX ORDER — FLUTICASONE PROPIONATE 50 MCG
2 SPRAY, SUSPENSION (ML) NASAL DAILY
Qty: 16 G | Refills: 0 | Status: SHIPPED | OUTPATIENT
Start: 2019-10-06 | End: 2019-10-16

## 2019-10-06 SDOH — HEALTH STABILITY: MENTAL HEALTH: HOW OFTEN DO YOU HAVE A DRINK CONTAINING ALCOHOL?: NEVER

## 2019-10-06 ASSESSMENT — ENCOUNTER SYMPTOMS
HEADACHES: 1
CONFUSION: 0
RHINORRHEA: 1
COUGH: 1
SINUS PRESSURE: 1
DIZZINESS: 0
FEVER: 0
APPETITE CHANGE: 0
SINUS PAIN: 1
LIGHT-HEADEDNESS: 0
SORE THROAT: 1
TROUBLE SWALLOWING: 0
VOICE CHANGE: 0
ALLERGIC/IMMUNOLOGIC NEGATIVE: 1
FATIGUE: 0
EYES NEGATIVE: 1
NUMBNESS: 0
SHORTNESS OF BREATH: 0
WHEEZING: 0

## 2019-10-21 ENCOUNTER — HOSPITAL ENCOUNTER (OUTPATIENT)
Dept: GENERAL RADIOLOGY | Age: 69
Discharge: HOME | End: 2019-10-21
Attending: FAMILY MEDICINE
Payer: MEDICAID

## 2019-10-21 ENCOUNTER — WALK IN (OUTPATIENT)
Dept: URGENT CARE | Age: 69
End: 2019-10-21

## 2019-10-21 ENCOUNTER — OFFICE VISIT (OUTPATIENT)
Dept: FAMILY MEDICINE CLINIC | Facility: CLINIC | Age: 69
End: 2019-10-21
Payer: MEDICAID

## 2019-10-21 VITALS
HEIGHT: 63 IN | SYSTOLIC BLOOD PRESSURE: 121 MMHG | DIASTOLIC BLOOD PRESSURE: 76 MMHG | BODY MASS INDEX: 24.63 KG/M2 | HEART RATE: 73 BPM | WEIGHT: 139 LBS | TEMPERATURE: 98 F

## 2019-10-21 DIAGNOSIS — M79.605 LUMBAR PAIN WITH RADIATION DOWN BOTH LEGS: ICD-10-CM

## 2019-10-21 DIAGNOSIS — M79.604 LUMBAR PAIN WITH RADIATION DOWN BOTH LEGS: Primary | ICD-10-CM

## 2019-10-21 DIAGNOSIS — M54.50 LUMBAR PAIN WITH RADIATION DOWN BOTH LEGS: ICD-10-CM

## 2019-10-21 DIAGNOSIS — M79.604 LUMBAR PAIN WITH RADIATION DOWN BOTH LEGS: ICD-10-CM

## 2019-10-21 DIAGNOSIS — M54.50 LUMBAR PAIN WITH RADIATION DOWN BOTH LEGS: Primary | ICD-10-CM

## 2019-10-21 DIAGNOSIS — Z23 FLU VACCINE NEED: Primary | ICD-10-CM

## 2019-10-21 DIAGNOSIS — M79.605 LUMBAR PAIN WITH RADIATION DOWN BOTH LEGS: Primary | ICD-10-CM

## 2019-10-21 PROCEDURE — 72110 X-RAY EXAM L-2 SPINE 4/>VWS: CPT | Performed by: FAMILY MEDICINE

## 2019-10-21 PROCEDURE — 99213 OFFICE O/P EST LOW 20 MIN: CPT | Performed by: FAMILY MEDICINE

## 2019-10-21 PROCEDURE — 90471 IMMUNIZATION ADMIN: CPT | Performed by: NURSE PRACTITIONER

## 2019-10-21 PROCEDURE — 90662 IIV NO PRSV INCREASED AG IM: CPT | Performed by: NURSE PRACTITIONER

## 2019-10-21 RX ORDER — CHLORHEXIDINE GLUCONATE 0.12 MG/ML
RINSE ORAL
Refills: 0 | COMMUNITY
Start: 2019-10-09 | End: 2020-03-16

## 2019-10-21 RX ORDER — FLUCONAZOLE 150 MG/1
TABLET ORAL
Refills: 0 | COMMUNITY
Start: 2019-10-17 | End: 2020-03-16

## 2019-10-21 RX ORDER — MISOPROSTOL 200 UG/1
TABLET ORAL
Refills: 0 | COMMUNITY
Start: 2019-10-11 | End: 2020-03-16

## 2019-10-21 NOTE — PROGRESS NOTES
HPI:    Patient ID: Gabriel Woodall is a 78year old female. HPI  Patient presents with:   Follow - Up: pt states she had a sinus infection last week pt went to [facility] and was prescribed amoxicillin for 5 days    Low Back Pain    Patient took amoxicill for Pain., Disp: 12 tablet, Rfl: 0      Allergies:  Bactrim [Sulfametho*    SWELLING, Tightness in Throat  Macrobid [Nitrofura*    RASH, PAIN, Tightness in Chest  Crestor [Rosuvastat*    PAIN    Comment:Abdominal pain  Penicillins             PAIN    Comme this encounter       Imaging & Referrals:  FLU VACC HIGH DOSE PRSV FREE  XR LUMBAR SPINE (MIN 2 VIEWS) (CPT=72100)       QA#[id number]

## 2019-10-22 DIAGNOSIS — M79.604 LUMBAR PAIN WITH RADIATION DOWN BOTH LEGS: Primary | ICD-10-CM

## 2019-10-22 DIAGNOSIS — M79.605 LUMBAR PAIN WITH RADIATION DOWN BOTH LEGS: Primary | ICD-10-CM

## 2019-10-22 DIAGNOSIS — M54.50 LUMBAR PAIN WITH RADIATION DOWN BOTH LEGS: Primary | ICD-10-CM

## 2019-10-22 DIAGNOSIS — M41.9 SCOLIOSIS, UNSPECIFIED SCOLIOSIS TYPE, UNSPECIFIED SPINAL REGION: ICD-10-CM

## 2019-10-22 DIAGNOSIS — M51.36 LUMBAR DEGENERATIVE DISC DISEASE: ICD-10-CM

## 2019-11-08 ENCOUNTER — OFFICE VISIT (OUTPATIENT)
Dept: PHYSICAL THERAPY | Age: 69
End: 2019-11-08
Attending: FAMILY MEDICINE
Payer: MEDICAID

## 2019-11-08 DIAGNOSIS — M54.50 LUMBAR PAIN WITH RADIATION DOWN BOTH LEGS: ICD-10-CM

## 2019-11-08 DIAGNOSIS — M41.9 SCOLIOSIS, UNSPECIFIED SCOLIOSIS TYPE, UNSPECIFIED SPINAL REGION: ICD-10-CM

## 2019-11-08 DIAGNOSIS — M79.604 LUMBAR PAIN WITH RADIATION DOWN BOTH LEGS: ICD-10-CM

## 2019-11-08 DIAGNOSIS — M51.36 LUMBAR DEGENERATIVE DISC DISEASE: ICD-10-CM

## 2019-11-08 DIAGNOSIS — M79.605 LUMBAR PAIN WITH RADIATION DOWN BOTH LEGS: ICD-10-CM

## 2019-11-08 PROCEDURE — 97110 THERAPEUTIC EXERCISES: CPT

## 2019-11-08 PROCEDURE — 97162 PT EVAL MOD COMPLEX 30 MIN: CPT

## 2019-11-13 ENCOUNTER — OFFICE VISIT (OUTPATIENT)
Dept: PHYSICAL THERAPY | Age: 69
End: 2019-11-13
Attending: FAMILY MEDICINE
Payer: MEDICAID

## 2019-11-13 DIAGNOSIS — M79.604 LUMBAR PAIN WITH RADIATION DOWN BOTH LEGS: ICD-10-CM

## 2019-11-13 DIAGNOSIS — M41.9 SCOLIOSIS, UNSPECIFIED SCOLIOSIS TYPE, UNSPECIFIED SPINAL REGION: ICD-10-CM

## 2019-11-13 DIAGNOSIS — M79.605 LUMBAR PAIN WITH RADIATION DOWN BOTH LEGS: ICD-10-CM

## 2019-11-13 DIAGNOSIS — M51.36 LUMBAR DEGENERATIVE DISC DISEASE: ICD-10-CM

## 2019-11-13 DIAGNOSIS — M54.50 LUMBAR PAIN WITH RADIATION DOWN BOTH LEGS: ICD-10-CM

## 2019-11-13 PROCEDURE — 97110 THERAPEUTIC EXERCISES: CPT

## 2019-11-13 NOTE — PROGRESS NOTES
Dx: Lumbar pain with radiation down both legs (M54.5)  Scoliosis, unspecified scoliosis type, unspecified spinal region (M41.9)  Lumbar degenerative disc disease (M51.36)         Insurance (Authorized # of Visits):  [facility] x 7 visits unti/12/8/19   P flexion and abduction 2x10  Gentle supine hams stretches 10 x5  QS 10 secs x10 each LE  Heel slides  Supine alternate marches 2x10  Seated trunk flexion 10 secs x5   Theraex Theraex Theraex Theraex   Manual Manual Manual Manual Manual   Gait/NMRed Gait/NMR

## 2019-11-20 ENCOUNTER — TELEPHONE (OUTPATIENT)
Dept: PHYSICAL THERAPY | Age: 69
End: 2019-11-20

## 2019-11-26 ENCOUNTER — OFFICE VISIT (OUTPATIENT)
Dept: PHYSICAL THERAPY | Age: 69
End: 2019-11-26
Attending: FAMILY MEDICINE
Payer: MEDICAID

## 2019-11-26 DIAGNOSIS — M79.605 LUMBAR PAIN WITH RADIATION DOWN BOTH LEGS: ICD-10-CM

## 2019-11-26 DIAGNOSIS — M79.604 LUMBAR PAIN WITH RADIATION DOWN BOTH LEGS: ICD-10-CM

## 2019-11-26 DIAGNOSIS — M51.36 LUMBAR DEGENERATIVE DISC DISEASE: ICD-10-CM

## 2019-11-26 DIAGNOSIS — M41.9 SCOLIOSIS, UNSPECIFIED SCOLIOSIS TYPE, UNSPECIFIED SPINAL REGION: ICD-10-CM

## 2019-11-26 DIAGNOSIS — M54.50 LUMBAR PAIN WITH RADIATION DOWN BOTH LEGS: ICD-10-CM

## 2019-11-26 PROCEDURE — 97110 THERAPEUTIC EXERCISES: CPT

## 2019-11-26 NOTE — PROGRESS NOTES
Dx: Lumbar pain with radiation down both legs (M54.5)  Scoliosis, unspecified scoliosis type, unspecified spinal region (M41.9)  Lumbar degenerative disc disease (M51.36)         Insurance (Authorized # of Visits):  [facility] x 7 visits unti/12/8/19   P marches 2x10  Seated trunk flexion 10 secs x5   Theraex  NU step level 3 x6 mins  Seated trunk flexion 20 x5  Supine marches 2x10  Supine SLR 2x10  NWB lumbar rotation 2x10  Bridges 2x10  Supine hip abd RTB 2x10  Shuttle BLE 3 bands 2x10  Shutte R/L LE 1 b

## 2019-12-02 ENCOUNTER — OFFICE VISIT (OUTPATIENT)
Dept: SURGERY | Facility: CLINIC | Age: 69
End: 2019-12-02
Payer: MEDICAID

## 2019-12-02 VITALS — WEIGHT: 139 LBS | HEIGHT: 63 IN | BODY MASS INDEX: 24.63 KG/M2

## 2019-12-02 DIAGNOSIS — Z98.890 POST-OPERATIVE STATE: Primary | ICD-10-CM

## 2019-12-02 PROCEDURE — 99212 OFFICE O/P EST SF 10 MIN: CPT | Performed by: SURGERY

## 2019-12-02 NOTE — PROGRESS NOTES
S:  Maximiliano Sue is a 78year old female sp excisional biopsy right breast, papilloma  Doing well, no fevers, no chills, tolerating a general diet, generally normal bowel movements, no calf tenderness nor lower extremity edema, no shortness of breath, no

## 2019-12-03 ENCOUNTER — OFFICE VISIT (OUTPATIENT)
Dept: PHYSICAL THERAPY | Age: 69
End: 2019-12-03
Attending: FAMILY MEDICINE
Payer: MEDICAID

## 2019-12-03 DIAGNOSIS — M51.36 LUMBAR DEGENERATIVE DISC DISEASE: ICD-10-CM

## 2019-12-03 DIAGNOSIS — M54.50 LUMBAR PAIN WITH RADIATION DOWN BOTH LEGS: ICD-10-CM

## 2019-12-03 DIAGNOSIS — M79.605 LUMBAR PAIN WITH RADIATION DOWN BOTH LEGS: ICD-10-CM

## 2019-12-03 DIAGNOSIS — M79.604 LUMBAR PAIN WITH RADIATION DOWN BOTH LEGS: ICD-10-CM

## 2019-12-03 DIAGNOSIS — M41.9 SCOLIOSIS, UNSPECIFIED SCOLIOSIS TYPE, UNSPECIFIED SPINAL REGION: ICD-10-CM

## 2019-12-03 PROCEDURE — 97110 THERAPEUTIC EXERCISES: CPT

## 2019-12-03 NOTE — PROGRESS NOTES
Dx: Lumbar pain with radiation down both legs (M54.5)  Scoliosis, unspecified scoliosis type, unspecified spinal region (M41.9)  Lumbar degenerative disc disease (M51.36)         Insurance (Authorized # of Visits):  [facility] x 7 visits until 1/7/19   P symptoms free to be able bend with no issues: on going, not met on flexion/extension  3. Pt will improve strength on BLE and abdominals graded below 5/5 to at least half a grade or better for ease of stairs and transitional transfers: on going  4.  Pt will

## 2019-12-06 ENCOUNTER — OFFICE VISIT (OUTPATIENT)
Dept: PHYSICAL THERAPY | Age: 69
End: 2019-12-06
Attending: FAMILY MEDICINE
Payer: MEDICAID

## 2019-12-06 DIAGNOSIS — M51.36 LUMBAR DEGENERATIVE DISC DISEASE: ICD-10-CM

## 2019-12-06 DIAGNOSIS — M54.50 LUMBAR PAIN WITH RADIATION DOWN BOTH LEGS: ICD-10-CM

## 2019-12-06 DIAGNOSIS — M79.604 LUMBAR PAIN WITH RADIATION DOWN BOTH LEGS: ICD-10-CM

## 2019-12-06 DIAGNOSIS — M41.9 SCOLIOSIS, UNSPECIFIED SCOLIOSIS TYPE, UNSPECIFIED SPINAL REGION: ICD-10-CM

## 2019-12-06 DIAGNOSIS — M79.605 LUMBAR PAIN WITH RADIATION DOWN BOTH LEGS: ICD-10-CM

## 2019-12-06 PROCEDURE — 97110 THERAPEUTIC EXERCISES: CPT

## 2019-12-06 NOTE — PROGRESS NOTES
Dx: Lumbar pain with radiation down both legs (M54.5)  Scoliosis, unspecified scoliosis type, unspecified spinal region (M41.9)  Lumbar degenerative disc disease (M51.36)         Insurance (Authorized # of Visits):  [facility] x 7 visits until 1/7/19   P BLE and abdominals graded below 5/5 to at least half a grade or better for ease of stairs and transitional transfers: on going  4.  Pt will report overall decreased in pain and symptoms by 50% or better to be able to stand and walk with no issues after 30 m Gait/NMRed Gait/NMRed   Others:HP in sitting x10 mins Others: Others: Others:  Others:     HEP: standing hip exercises, SLS and heel raises  Charges: theraex x3       Total Timed Treatment: 45 min  Total Treatment Time: 45 min

## 2019-12-10 ENCOUNTER — OFFICE VISIT (OUTPATIENT)
Dept: PHYSICAL THERAPY | Age: 69
End: 2019-12-10
Attending: FAMILY MEDICINE
Payer: MEDICAID

## 2019-12-10 DIAGNOSIS — M79.605 LUMBAR PAIN WITH RADIATION DOWN BOTH LEGS: ICD-10-CM

## 2019-12-10 DIAGNOSIS — M54.50 LUMBAR PAIN WITH RADIATION DOWN BOTH LEGS: ICD-10-CM

## 2019-12-10 DIAGNOSIS — M79.604 LUMBAR PAIN WITH RADIATION DOWN BOTH LEGS: ICD-10-CM

## 2019-12-10 DIAGNOSIS — M41.9 SCOLIOSIS, UNSPECIFIED SCOLIOSIS TYPE, UNSPECIFIED SPINAL REGION: ICD-10-CM

## 2019-12-10 DIAGNOSIS — M51.36 LUMBAR DEGENERATIVE DISC DISEASE: ICD-10-CM

## 2019-12-10 PROCEDURE — 97110 THERAPEUTIC EXERCISES: CPT

## 2019-12-10 NOTE — PROGRESS NOTES
Dx: Lumbar pain with radiation down both legs (M54.5)  Scoliosis, unspecified scoliosis type, unspecified spinal region (M41.9)  Lumbar degenerative disc disease (M51.36)         Insurance (Authorized # of Visits):  [facility] until 1[facility]7/19   P WFL, pain and symptoms free to be able bend with no issues: on going, not met on flexion/extension  3.  Pt will improve strength on BLE and abdominals graded below 5/5 to at least half a grade or better for ease of stairs and transitional transfers: on marin

## 2019-12-12 ENCOUNTER — OFFICE VISIT (OUTPATIENT)
Dept: PHYSICAL THERAPY | Age: 69
End: 2019-12-12
Attending: FAMILY MEDICINE
Payer: MEDICAID

## 2019-12-12 DIAGNOSIS — M54.50 LUMBAR PAIN WITH RADIATION DOWN BOTH LEGS: ICD-10-CM

## 2019-12-12 DIAGNOSIS — M41.9 SCOLIOSIS, UNSPECIFIED SCOLIOSIS TYPE, UNSPECIFIED SPINAL REGION: ICD-10-CM

## 2019-12-12 DIAGNOSIS — M79.604 LUMBAR PAIN WITH RADIATION DOWN BOTH LEGS: ICD-10-CM

## 2019-12-12 DIAGNOSIS — M79.605 LUMBAR PAIN WITH RADIATION DOWN BOTH LEGS: ICD-10-CM

## 2019-12-12 DIAGNOSIS — M51.36 LUMBAR DEGENERATIVE DISC DISEASE: ICD-10-CM

## 2019-12-12 PROCEDURE — 97110 THERAPEUTIC EXERCISES: CPT

## 2019-12-12 NOTE — PROGRESS NOTES
Progress Summary    Progress Note Start Date: 19 End Date: 19    Pt has attended 7, cancelled 0, and no shown 0 visits in Physical Therapy.       Patient Name: Noah Yates, : 1950, MRN: [id number]   Date:  2019  Referring Physi   Motor Control Motor Control     Double Leg Squat Decreased Decreased     Single Leg Squat Other: NT Other: NT     Single Leg Balance 10 sec 5 sec        ABDOMINALS:3/5     LE ROM AND STRENGTH:   BLE major joints are WFL  painfree      AROM /MMT: : Trupti Jimenez Thank you for your referral. Please co-sign or sign and return this letter via fax as soon as possible to [phone]. If you have any questions, please contact me at Dept: [phone]. Sincerely,    Electronically signed by therapist:Marybel Anderson,

## 2020-01-06 ENCOUNTER — APPOINTMENT (OUTPATIENT)
Dept: PHYSICAL THERAPY | Age: 70
End: 2020-01-06
Attending: PHYSICIAN ASSISTANT
Payer: MEDICAID

## 2020-01-07 ENCOUNTER — OFFICE VISIT (OUTPATIENT)
Dept: PHYSICAL THERAPY | Age: 70
End: 2020-01-07
Attending: PHYSICIAN ASSISTANT
Payer: MEDICAID

## 2020-01-07 PROCEDURE — 97110 THERAPEUTIC EXERCISES: CPT

## 2020-01-07 NOTE — PROGRESS NOTES
Dx: Lumbar pain with radiation down both legs (M54.5)  Scoliosis, unspecified scoliosis type, unspecified spinal region (M41.9)  Lumbar degenerative disc disease (M51.36)         Insurance (Authorized # of Visits):  [facility] x 4 visits until 2/20/20 progress notes Theraex  NU step level 5 x6  Mins  Shuttle BLE 6 bands 2x10  Shuttle R/LE 4 bands 2x10  Supine NWB lumbar rotation 2x10 with PT OP x10 each side  Standing lateral flexion 1# 2x10  DKTC with PT OP   Prone hip extension 1# 2x10  SLR 2# 2x10  B

## 2020-01-08 ENCOUNTER — OFFICE VISIT (OUTPATIENT)
Dept: PHYSICAL THERAPY | Age: 70
End: 2020-01-08
Attending: PHYSICIAN ASSISTANT
Payer: MEDICAID

## 2020-01-08 PROCEDURE — 97110 THERAPEUTIC EXERCISES: CPT

## 2020-01-08 NOTE — PROGRESS NOTES
Dx: Lumbar pain with radiation down both legs (M54.5)  Scoliosis, unspecified scoliosis type, unspecified spinal region (M41.9)  Lumbar degenerative disc disease (M51.36)         Insurance (Authorized # of Visits):  [facility] x 4 visits until 2/20/20 Date:             TX#:  Date: 12/10/19  Tx#: 6/7   Insurance:[facility] x7 visits Insurance:[facility] Insurance:[facility] Insurance:  [facility] Insurance:   Expiration date :1/7/19 Expiration date :2/2/0/20 Expiration date :  2/2/0/20   [place]

## 2020-01-13 ENCOUNTER — OFFICE VISIT (OUTPATIENT)
Dept: PHYSICAL THERAPY | Age: 70
End: 2020-01-13
Attending: PHYSICIAN ASSISTANT
Payer: MEDICAID

## 2020-01-13 PROCEDURE — 97110 THERAPEUTIC EXERCISES: CPT

## 2020-01-13 NOTE — PROGRESS NOTES
Dx: Lumbar pain with radiation down both legs (M54.5)  Scoliosis, unspecified scoliosis type, unspecified spinal region (M41.9)  Lumbar degenerative disc disease (M51.36)         Insurance (Authorized # of Visits):  [facility] x 4 visits until 2/20/20 half a grade or better for ease of stairs and transitional transfers: on going  4.  Pt will report overall decreased in pain and symptoms by 50% or better to be able to stand and walk with no issues after 30 mins 75% of the rime:70-80%, no radiating pain, l HEP in supine added DKTC and hams stretches in supine  Charges: theraex x3 Total Timed Treatment: 45 min  Total Treatment Time: 45 mi

## 2020-01-14 ENCOUNTER — TELEPHONE (OUTPATIENT)
Dept: SURGERY | Facility: CLINIC | Age: 70
End: 2020-01-14

## 2020-01-14 ENCOUNTER — TELEPHONE (OUTPATIENT)
Dept: FAMILY MEDICINE CLINIC | Facility: CLINIC | Age: 70
End: 2020-01-14

## 2020-01-14 NOTE — TELEPHONE ENCOUNTER
Per pt she keeps getting letter from radiologist that repeat mammogram is needed, asking what Dr. Zamudio Going recommendations are? Please call thank you.

## 2020-01-15 DIAGNOSIS — D24.1 PAPILLOMA OF RIGHT BREAST: Primary | ICD-10-CM

## 2020-01-16 ENCOUNTER — OFFICE VISIT (OUTPATIENT)
Dept: PHYSICAL THERAPY | Age: 70
End: 2020-01-16
Attending: PHYSICIAN ASSISTANT
Payer: MEDICAID

## 2020-01-16 PROCEDURE — 97110 THERAPEUTIC EXERCISES: CPT

## 2020-01-16 NOTE — PROGRESS NOTES
Patient Name: Harmeet Sun, : 1950, MRN: [id number]   Date:  2020  Referring Physician:  Rea Martinez    Diagnosis: Lumbar pain with radiation down both legs (M54.5)  Scoliosis, unspecified scoliosis type, unspecified spinal region (M41.9) Goals: 1. Pt will be I with HEP,its progression and management of symptoms and be I with self correction of posture to continue with gains in therapy.:met  2.  Pt will demonstrate improved AROM in all planes of lumbar motion to WFL, pain and symptoms free

## 2020-01-30 ENCOUNTER — APPOINTMENT (OUTPATIENT)
Dept: PHYSICAL THERAPY | Age: 70
End: 2020-01-30
Attending: PHYSICIAN ASSISTANT
Payer: MEDICAID

## 2020-02-04 ENCOUNTER — APPOINTMENT (OUTPATIENT)
Dept: PHYSICAL THERAPY | Age: 70
End: 2020-02-04
Attending: PHYSICIAN ASSISTANT
Payer: MEDICAID

## 2020-02-04 ENCOUNTER — TELEPHONE (OUTPATIENT)
Dept: GASTROENTEROLOGY | Facility: CLINIC | Age: 70
End: 2020-02-04

## 2020-02-04 RX ORDER — SUCRALFATE 1 G/1
TABLET ORAL
Qty: 120 TABLET | Refills: 5 | Status: SHIPPED | OUTPATIENT
Start: 2020-02-04 | End: 2020-03-16

## 2020-02-04 NOTE — TELEPHONE ENCOUNTER
Daughter requesting new script for sucralfate 1MG. Daughter states pt was prescribed medication by Dr. Marquis Ramirez previously. Daughter states pt has stomach pain before and after eating. Daughter also states pt has some discomfort.  Daughter states pt has constip

## 2020-02-04 NOTE — TELEPHONE ENCOUNTER
Spoke with pt and daughter. Reports upper abdominal pain about an hour before eating. Pain is less after eating but still present. Denies nausea or vomiting. Denies fever, body aches or chills. Takes pantoprazole daily.   Has used sucralfate in the pas

## 2020-02-04 NOTE — TELEPHONE ENCOUNTER
Leonila ordered  Low FODMAP diet  Miralax PRN for constipation. If very constipated can take one dose of milk of magnesium.    Follow up in clinic in 3 months if no improvement as would consider abdominal imaging

## 2020-02-05 NOTE — TELEPHONE ENCOUNTER
I spoke to the pt's daughter Beatriz Veliz. We went over all of Dr Sondra Osgood recommendations and f/u instructions below. All her questions were answered and she verbalizes understanding. I placed a copy of the low FODMAP diet in the mail for her.  Address [facility]

## 2020-02-28 ENCOUNTER — HOSPITAL ENCOUNTER (OUTPATIENT)
Dept: MAMMOGRAPHY | Facility: HOSPITAL | Age: 70
Discharge: HOME | End: 2020-02-28
Attending: SURGERY
Payer: MEDICAID

## 2020-02-28 DIAGNOSIS — D24.1 PAPILLOMA OF RIGHT BREAST: ICD-10-CM

## 2020-02-28 PROCEDURE — 77061 BREAST TOMOSYNTHESIS UNI: CPT | Performed by: SURGERY

## 2020-02-28 PROCEDURE — 77065 DX MAMMO INCL CAD UNI: CPT | Performed by: SURGERY

## 2020-03-02 ENCOUNTER — OFFICE VISIT (OUTPATIENT)
Dept: SURGERY | Facility: CLINIC | Age: 70
End: 2020-03-02
Payer: MEDICAID

## 2020-03-02 VITALS — HEIGHT: 63 IN | BODY MASS INDEX: 24.63 KG/M2 | WEIGHT: 139 LBS

## 2020-03-02 DIAGNOSIS — D24.9 PAPILLOMA OF BREAST, UNSPECIFIED LATERALITY: Primary | ICD-10-CM

## 2020-03-02 PROCEDURE — 99213 OFFICE O/P EST LOW 20 MIN: CPT | Performed by: SURGERY

## 2020-03-02 NOTE — H&P
Chief complaint: Patient presents with: Follow - Up: F/U BR, excision R BR mass, 8/15/2019. F/U mammo 2/28/2020. HPI: Francisco Javier Chan presents for clinical breast exam.  She is status post excision of papilloma.   Her incision is healed well and she has no rec Dissolve and mix 1 tablet into a glass of water then drink; take 4 times daily after meals and at bedtime 120 tablet 5   • fluconazole 150 MG Oral Tab   0   • Chlorhexidine Gluconate 0.12 % Mouth/Throat Solution RINSE 15 ML PO BID.  DO NOT EAT OR DRK FOR 30 headaches  PSYCHE:no depression or anxiety  HEMATOLOGIC: no hx of blood dyscrasia  ENDOCRINE: no new endocrine problems  ALL/ASTHMA: no new hx of severe allergy or asthma  BACK: normal, no spinal deformity, no CVA tenderness    Physical examination:  Const

## 2020-03-10 ENCOUNTER — TELEPHONE (OUTPATIENT)
Dept: OTHER | Age: 70
End: 2020-03-10

## 2020-03-10 DIAGNOSIS — M54.50 LUMBAR PAIN WITH RADIATION DOWN BOTH LEGS: Primary | ICD-10-CM

## 2020-03-10 DIAGNOSIS — M79.605 LUMBAR PAIN WITH RADIATION DOWN BOTH LEGS: Primary | ICD-10-CM

## 2020-03-10 DIAGNOSIS — M79.604 LUMBAR PAIN WITH RADIATION DOWN BOTH LEGS: Primary | ICD-10-CM

## 2020-03-10 NOTE — TELEPHONE ENCOUNTER
Spoke with the patient's daughter-in-law named Karlibrooklyn Gibbons who is on HIPAA. Karli Gibbons was made aware of Dr. Ema Rdz message and plan of care. Karli Gibbons was provided with the phone number to physiatry.  Melanie voiced understanding and agreed with the plan of car

## 2020-03-10 NOTE — TELEPHONE ENCOUNTER
Patient's daughter in law (on NATE) calling in to ask for Dr. Cardona Aid advice as the patient has finished PT for her back pain but it is only helping temporarily. Patient does not take anything for the discomfort.  Her daughter in law is asking should she see

## 2020-03-16 ENCOUNTER — OFFICE VISIT (OUTPATIENT)
Dept: NEUROLOGY | Facility: CLINIC | Age: 70
End: 2020-03-16
Payer: MEDICAID

## 2020-03-16 ENCOUNTER — TELEPHONE (OUTPATIENT)
Dept: NEUROLOGY | Facility: CLINIC | Age: 70
End: 2020-03-16

## 2020-03-16 VITALS — RESPIRATION RATE: 18 BRPM | WEIGHT: 148 LBS | BODY MASS INDEX: 27.23 KG/M2 | HEIGHT: 62 IN

## 2020-03-16 DIAGNOSIS — G47.9 SLEEP DISTURBANCE: ICD-10-CM

## 2020-03-16 DIAGNOSIS — M51.36 LUMBAR DEGENERATIVE DISC DISEASE: ICD-10-CM

## 2020-03-16 DIAGNOSIS — R29.3 POOR POSTURE: ICD-10-CM

## 2020-03-16 DIAGNOSIS — M54.16 LUMBAR RADICULOPATHY, CHRONIC: Primary | ICD-10-CM

## 2020-03-16 PROCEDURE — 99244 OFF/OP CNSLTJ NEW/EST MOD 40: CPT | Performed by: PHYSICAL MEDICINE & REHABILITATION

## 2020-03-16 RX ORDER — GABAPENTIN 100 MG/1
100 CAPSULE ORAL 3 TIMES DAILY
Qty: 90 CAPSULE | Refills: 0 | Status: SHIPPED | OUTPATIENT
Start: 2020-03-16

## 2020-03-16 NOTE — PATIENT INSTRUCTIONS
-MRI of the lumbar spine  -Gabapentin 100mg TID as tolerated and slowly go up as discussed  -Follow up after imaging

## 2020-03-16 NOTE — TELEPHONE ENCOUNTER
Online [employer] for Kadie Ramos for authorization of approval for MRI LUMBAR SPINE (cpt=72148) Request was approved. Authorization#[id number] effective 3/16/20 to 4/30/20. Will inform patient. Patient informed of approval. Can proceed to schedule the appointment.

## 2020-03-16 NOTE — PROGRESS NOTES
[address]  NEW PATIENT EVALUATION    Consultation as a request of Dr. Angela Joaquin    Chief Complaint: back pain.     HISTORY OF PRESENT ILLNESS:   Patient presents with:  Low Back Pain: new patient c/o low back curtis Vikki Juarez MD at [facility] ENDO   • ESOPHAGOGASTRODUODENOSCOPY (EGD) N/A 2018    Performed by Bud Elizabeth MD at [facility] ENDO   • LUMPECTOMY RIGHT     • NEEDLE BIOPSY RIGHT     •       1         CURRENT MEDICATIONS:     Current Outpatient M Urination: denies   Musculoskeletal  Joint Stiffness: admits  Painful Joints: admits  Tailbone Pain: denies  Swollen Joints: denies   Peripheral Vascular  Swelling of Legs/Feet: denies  Cold Extremities: denies   Skin  Open Sores: denies  Nodules or Lumps: 39.7 10/17/2019    MCV 84.8 10/17/2019    MCH 27.8 10/17/2019    MCHC 32.7 10/17/2019    RDW 13.4 10/17/2019     10/17/2019    MPV 7.9 09/10/2018     Lab Results   Component Value Date    GLU 96 08/04/2019    BUN 11 08/04/2019    BUNCREA 15.7 08/04/ questions/concerns were addressed and there were no barriers to learning. Camille GILBERT. [address] [facility]  Physical Medicine and Rehabilitation/Sports Medicine

## 2020-03-24 ENCOUNTER — TELEPHONE (OUTPATIENT)
Dept: FAMILY MEDICINE CLINIC | Facility: CLINIC | Age: 70
End: 2020-03-24

## 2020-03-25 NOTE — TELEPHONE ENCOUNTER
Form faxed back to [address] Form reviewed for knee brace  Dr. Cassandra Ortiz did  not prescribe this  No clinical notes

## 2020-03-30 ENCOUNTER — TELEPHONE (OUTPATIENT)
Dept: GASTROENTEROLOGY | Facility: CLINIC | Age: 70
End: 2020-03-30

## 2020-03-30 DIAGNOSIS — R10.10 PAIN OF UPPER ABDOMEN: Primary | ICD-10-CM

## 2020-03-30 NOTE — TELEPHONE ENCOUNTER
GI RNs,    Please call and advise Ms. Craig to take the pantoprazole on a daily basis. If any question of ongoing constipation, needs to either take the MiraLAX every day or regular doses of milk of magnesia as needed.     Dr. Tonya Cho mentioned considerati

## 2020-03-30 NOTE — TELEPHONE ENCOUNTER
Left message on voicemail to call back. Refer to Dr Jarek De La Paz telephone message 2/4 to see if pt followed instructions given.

## 2020-03-30 NOTE — TELEPHONE ENCOUNTER
Melanie contacted, given message below, and that Dr Nichol Clements will contact her /pt with further orders next week upon her return to office. Pt again advised that if symptoms become severe to present to ER.

## 2020-03-30 NOTE — TELEPHONE ENCOUNTER
Forwarded to Dr Henry Goode, on call and Dr Kaila Alonso upon her return to office next week. Please do not close encounter. Pt's daughter Watson Emerson contacted. See 2/4 encounter with Dr Eren Marroquin recommendations.      Past 2-3 days has been experiencing intermittant epig

## 2020-04-02 NOTE — TELEPHONE ENCOUNTER
Dr Javier Garcia, pt's daughter in law Rochelle Chan ( have NATE) accepted virtual tele visit Mon 4/6 at 10:30am. They will hold off for awhile on the x-ray. Thanks, please close encounter.

## 2020-04-02 NOTE — TELEPHONE ENCOUNTER
Ordered KUB, may need CT but do advise if not severe given COVID would manage at home as possible (per Dr. Donny Fernandez recommendations) and defer imaging as possible.  Can plan telephone visit if patient desires next week Monday morning

## 2020-04-06 ENCOUNTER — VIRTUAL PHONE E/M (OUTPATIENT)
Dept: GASTROENTEROLOGY | Facility: CLINIC | Age: 70
End: 2020-04-06
Payer: MEDICAID

## 2020-04-06 DIAGNOSIS — R10.30 LOWER ABDOMINAL PAIN: ICD-10-CM

## 2020-04-06 DIAGNOSIS — K29.60 REFLUX GASTRITIS: ICD-10-CM

## 2020-04-06 DIAGNOSIS — R10.10 PAIN OF UPPER ABDOMEN: Primary | ICD-10-CM

## 2020-04-06 DIAGNOSIS — K64.8 OTHER HEMORRHOIDS: ICD-10-CM

## 2020-04-06 PROCEDURE — 99213 OFFICE O/P EST LOW 20 MIN: CPT | Performed by: INTERNAL MEDICINE

## 2020-04-06 RX ORDER — DICYCLOMINE HCL 20 MG
TABLET ORAL
Qty: 90 TABLET | Refills: 3 | Status: SHIPPED | OUTPATIENT
Start: 2020-04-06

## 2020-04-06 NOTE — PROGRESS NOTES
Virtual/Telephone Check-In    Norispriscilla Carbone verbally consents to a Virtual/Telephone Check-In service on 04/06/20. Patient understands and accepts financial responsibility for any deductible, co-insurance and/or co-pays associated with this service.     D episode would hold off surgery  Continue low residue diet but given improvement can slowly introduce cooked veggies and fiber     Constipation  Ok to slowly reintroduce fiber as pain is gone  Miralax daily  Increase water and activity     Hemorrhoids  Can

## 2020-04-20 ENCOUNTER — TELEPHONE (OUTPATIENT)
Dept: GASTROENTEROLOGY | Facility: CLINIC | Age: 70
End: 2020-04-20

## 2020-04-20 RX ORDER — OMEPRAZOLE 20 MG/1
20 CAPSULE, DELAYED RELEASE ORAL EVERY MORNING
Qty: 90 CAPSULE | Refills: 3 | Status: SHIPPED | OUTPATIENT
Start: 2020-04-20 | End: 2021-04-15

## 2020-04-20 NOTE — TELEPHONE ENCOUNTER
Pts daughter in law Lynette Pemberton states that pts symptoms discussed at her 4/6/20 visit are about the same. Pt did not take medication that was prescribed or have xray done. She did take omeprazole and that helped.  Pt is now also having a lot of pain and bur

## 2020-04-20 NOTE — TELEPHONE ENCOUNTER
Please do not close encounter. Sent to both Dr Chey Urias and CMA pool. I spoke to Pharmacist Loan at [facility]. States suppositories usually not covered by Medicaid plans. She will fax form and we can attempt a prior auth. Form received.  Will give to [facility]

## 2020-04-20 NOTE — TELEPHONE ENCOUNTER
Omeprazole is better than pantoprazole so changed. Does have a lot of pain so will order steroid suppository and sitz. Still having bloating and reflux but omeprazole helps. Will hold off the dicyclomine given concern for constipation.  Will see in 1 we

## 2020-04-20 NOTE — TELEPHONE ENCOUNTER
For hemorrhoids try the suppository ordered. Also try sitz baths 4 times a day. Keep stools soft.  Avoid wiping too much and if going to wipe try the preperation H wipes

## 2020-04-20 NOTE — TELEPHONE ENCOUNTER
Dr Landry Mclaughlin, pt's daughter in law Jose Miguel Yip contacted, (have NATE). Reviewed your message below. Please send suppository to [facility] also like the pantoprazole to be replaced by omeprazole, as it worked well.  She is having lower abdominal \"burning\" wh

## 2020-04-21 NOTE — TELEPHONE ENCOUNTER
PA for suppositories submitted via fax to include visit notes. PA form, and last visit note (telephone visit from 4/6/2020) faxed to [address] Review Dept at [phone]. Phone: [phone]    Waiting for insurance decision.

## 2020-04-21 NOTE — TELEPHONE ENCOUNTER
AMADEO Ho Mai, received faxed letter from [address], ph [phone], fax [phone] denying suppositories below. Excluded in coverage.  Melanie contacted to inform, states they picked up at pharmacy last night and paid out of poc

## 2020-05-01 ENCOUNTER — TELEPHONE (OUTPATIENT)
Dept: GASTROENTEROLOGY | Facility: CLINIC | Age: 70
End: 2020-05-01

## 2020-05-01 PROCEDURE — 99212 OFFICE O/P EST SF 10 MIN: CPT | Performed by: INTERNAL MEDICINE

## 2020-05-01 NOTE — TELEPHONE ENCOUNTER
Telephone Check-In    Nicolás Jackson verbally consents to a Virtual/Telephone Check-In service on 05/01/20. Patient understands and accepts financial responsibility for any deductible, co-insurance and/or co-pays associated with this service.     Duration to alert me if there are persistent symptoms, such as  dysphagia, weight loss or GI bleeding. Continue omeprazole and low FODMAP diet. Also needs to increase exercise and at least 6 glasses of water or other liquids a day.  Make sure she is getting enoug

## 2020-05-01 NOTE — TELEPHONE ENCOUNTER
Daughter in law states patient continues to have mid upper abdominal pain and increased gas  for 1 month . Worse today after a bowel movement. Pain level 6 . Patient taking Miralax but not dicyclomine. Please advise on next step.  Feels pain is getting wo dicyclomine to try  KUB if severe     Amanda Elena MD

## 2020-05-04 ENCOUNTER — TELEPHONE (OUTPATIENT)
Dept: GASTROENTEROLOGY | Facility: CLINIC | Age: 70
End: 2020-05-04

## 2020-05-04 DIAGNOSIS — R10.30 LOWER ABDOMINAL PAIN: Primary | ICD-10-CM

## 2020-05-04 NOTE — TELEPHONE ENCOUNTER
Dr Villanueva Read, please see your 5/1 tele visit. Patient's daughter in law Micaela Castro contacted (have NATE). Patient symptoms remain the same. She is experiencing level 5-6 both upper and lower abdominal pain. Denies fever, diarrhea, blood in stool.  [facility] now [place]

## 2020-05-04 NOTE — TELEPHONE ENCOUNTER
Ordered.  Agree insurance may want KUB or other imaging first. Please let patient know needs to be scheduled 10-14 days out to get insurance approval.

## 2020-05-04 NOTE — TELEPHONE ENCOUNTER
Patient's daughter in law Chely Eden called stating that the patient is experiencing abdominal pain.  Please advise

## 2020-05-04 NOTE — TELEPHONE ENCOUNTER
Left message on Melanie's voicemail [phone] and listed home/cell voicemail [phone]. We have NATE.

## 2020-05-04 NOTE — TELEPHONE ENCOUNTER
Ozzy contacted again, reviewed below. Understands that must be scheduled 10-14 days out as must be authorized by insurance. Informed to expect a call from Gerald Rajan when authorized, and not to proceed without the authorization.  Given OP Registration

## 2020-06-16 ENCOUNTER — TELEPHONE (OUTPATIENT)
Dept: FAMILY MEDICINE CLINIC | Facility: CLINIC | Age: 70
End: 2020-06-16

## 2020-06-17 NOTE — TELEPHONE ENCOUNTER
Patients daughter in law scheduled appointment for 7/2. Asking if lab orders can be put in prior to appointment so they can be discussed the day of.

## 2020-06-24 ENCOUNTER — TELEPHONE (OUTPATIENT)
Dept: GASTROENTEROLOGY | Facility: CLINIC | Age: 70
End: 2020-06-24

## 2020-06-24 NOTE — TELEPHONE ENCOUNTER
Current Outpatient Medications   Medication Sig Dispense Refill   • omeprazole 20 MG Oral Capsule Delayed Release Take 1 capsule (20 mg total) by mouth every morning. 90 capsule 3     Per pharmacy patient's plan does not cover this med.   Please call plan a

## 2020-06-24 NOTE — TELEPHONE ENCOUNTER
1st reminder letter mailed out to patient on regards of her overdue labs and Imaging order ,     XR ABDOMEN (1 VIEW) (CPT=74018)                CT ABDOMEN+PELVIS(CONTRAST                         ONLY)(CPT=74177)             CDIFFICILE TOXIGENIC PCR (OPT)

## 2020-06-28 NOTE — TELEPHONE ENCOUNTER
Action Requested: Summary for Provider     []  Critical Lab, Recommendations Needed  [] Need Additional Advice  []   FYI    []   Need Orders  [] Need Medications Sent to Pharmacy  []  Other     SUMMARY: Appt Dr Annabelle Weston today 7/17/18 at 1:15pm    Onset several
complains of pain/discomfort

## 2020-07-02 ENCOUNTER — OFFICE VISIT (OUTPATIENT)
Dept: FAMILY MEDICINE CLINIC | Facility: CLINIC | Age: 70
End: 2020-07-02
Payer: MEDICAID

## 2020-07-02 VITALS
WEIGHT: 146 LBS | RESPIRATION RATE: 14 BRPM | SYSTOLIC BLOOD PRESSURE: 161 MMHG | DIASTOLIC BLOOD PRESSURE: 94 MMHG | TEMPERATURE: 98 F | HEART RATE: 68 BPM | HEIGHT: 62 IN | BODY MASS INDEX: 26.87 KG/M2

## 2020-07-02 DIAGNOSIS — M79.605 LUMBAR PAIN WITH RADIATION DOWN BOTH LEGS: ICD-10-CM

## 2020-07-02 DIAGNOSIS — Z00.00 WELL ADULT EXAM: Primary | ICD-10-CM

## 2020-07-02 DIAGNOSIS — Z12.31 ENCOUNTER FOR SCREENING MAMMOGRAM FOR BREAST CANCER: ICD-10-CM

## 2020-07-02 DIAGNOSIS — D24.9 PAPILLOMA OF BREAST, UNSPECIFIED LATERALITY: ICD-10-CM

## 2020-07-02 DIAGNOSIS — R03.0 ELEVATED BLOOD PRESSURE READING: ICD-10-CM

## 2020-07-02 DIAGNOSIS — M85.80 OSTEOPENIA, UNSPECIFIED LOCATION: ICD-10-CM

## 2020-07-02 DIAGNOSIS — M54.50 LUMBAR PAIN WITH RADIATION DOWN BOTH LEGS: ICD-10-CM

## 2020-07-02 DIAGNOSIS — M79.604 LUMBAR PAIN WITH RADIATION DOWN BOTH LEGS: ICD-10-CM

## 2020-07-02 DIAGNOSIS — Z01.419 ENCOUNTER FOR GYNECOLOGICAL EXAMINATION: ICD-10-CM

## 2020-07-02 PROCEDURE — 99397 PER PM REEVAL EST PAT 65+ YR: CPT | Performed by: FAMILY MEDICINE

## 2020-07-02 NOTE — PROGRESS NOTES
HPI:    Patient ID: Cecy Chan is a 69year old female. HPI  Patient presents with:  Physical: Patient present for Annual Physical    Patient gets usually very anxious being in the office.   She does mention that her  who was in the nursing ho 68   Temp 98.4 °F (36.9 °C) (Skin)   Resp 14   Ht 5' 2\" (1.575 m)   Wt 146 lb (66.2 kg)   BMI 26.70 kg/m²     Past Medical History:   Diagnosis Date   • Arthritis    • Diverticulosis large intestine w/o perforation or abscess w/o bleeding 12/5/2018   • Allison Katz Gets together: Not on file        Attends [employer] service: Not on file        Active member of club or organization: Not on file        Attends meetings of clubs or organizations: Not on file        Relationship status: Not on file      Intimate partner omeprazole 20 MG Oral Capsule Delayed Release Take 1 capsule (20 mg total) by mouth every morning. 90 capsule 3   • Dicyclomine HCl 20 MG Oral Tab Take 1-2 tablets (20-40 mg total) by mouth 4 (four) times daily before meals and nightly.  (Patient not taking lesion or injury on the left labia. Cervix exhibits no motion tenderness, no discharge and no friability. Right adnexum displays no mass, no tenderness and no fullness. Left adnexum displays no mass, no tenderness and no fullness. No vaginal discharge. mentions blood pressures at home have been much better ranging mostly systolic 1 71-6 30. Patient appears very anxious in the office and states she just came out of her home after few months. Her  passed away in May.       Also reminded patient to

## 2020-07-03 ENCOUNTER — LAB ENCOUNTER (OUTPATIENT)
Dept: LAB | Age: 70
End: 2020-07-03
Attending: FAMILY MEDICINE
Payer: MEDICAID

## 2020-07-03 DIAGNOSIS — Z00.00 WELL ADULT EXAM: ICD-10-CM

## 2020-07-03 LAB
ALBUMIN SERPL-MCNC: 3.7 G/DL (ref 3.4–5)
ALBUMIN/GLOB SERPL: 0.9 {RATIO} (ref 1–2)
ALP LIVER SERPL-CCNC: 98 U/L (ref 55–142)
ALT SERPL-CCNC: 19 U/L (ref 13–56)
ANION GAP SERPL CALC-SCNC: 7 MMOL/L (ref 0–18)
AST SERPL-CCNC: 16 U/L (ref 15–37)
BASOPHILS # BLD AUTO: 0.04 X10(3) UL (ref 0–0.2)
BASOPHILS NFR BLD AUTO: 0.6 %
BILIRUB SERPL-MCNC: 0.6 MG/DL (ref 0.1–2)
BUN BLD-MCNC: 13 MG/DL (ref 7–18)
BUN/CREAT SERPL: 16.3 (ref 10–20)
CALCIUM BLD-MCNC: 9.2 MG/DL (ref 8.5–10.1)
CHLORIDE SERPL-SCNC: 104 MMOL/L (ref 98–112)
CHOLEST SMN-MCNC: 286 MG/DL (ref ?–200)
CO2 SERPL-SCNC: 29 MMOL/L (ref 21–32)
CREAT BLD-MCNC: 0.8 MG/DL (ref 0.55–1.02)
DEPRECATED RDW RBC AUTO: 41.8 FL (ref 35.1–46.3)
EOSINOPHIL # BLD AUTO: 0.17 X10(3) UL (ref 0–0.7)
EOSINOPHIL NFR BLD AUTO: 2.7 %
ERYTHROCYTE [DISTWIDTH] IN BLOOD BY AUTOMATED COUNT: 13.4 % (ref 11–15)
GLOBULIN PLAS-MCNC: 4.1 G/DL (ref 2.8–4.4)
GLUCOSE BLD-MCNC: 93 MG/DL (ref 70–99)
HCT VFR BLD AUTO: 42.4 % (ref 35–48)
HDLC SERPL-MCNC: 50 MG/DL (ref 40–59)
HGB BLD-MCNC: 14.2 G/DL (ref 12–16)
IMM GRANULOCYTES # BLD AUTO: 0.01 X10(3) UL (ref 0–1)
IMM GRANULOCYTES NFR BLD: 0.2 %
LDLC SERPL CALC-MCNC: 206 MG/DL (ref ?–100)
LYMPHOCYTES # BLD AUTO: 2.19 X10(3) UL (ref 1–4)
LYMPHOCYTES NFR BLD AUTO: 34.9 %
M PROTEIN MFR SERPL ELPH: 7.8 G/DL (ref 6.4–8.2)
MCH RBC QN AUTO: 28.5 PG (ref 26–34)
MCHC RBC AUTO-ENTMCNC: 33.5 G/DL (ref 31–37)
MCV RBC AUTO: 85.1 FL (ref 80–100)
MONOCYTES # BLD AUTO: 0.58 X10(3) UL (ref 0.1–1)
MONOCYTES NFR BLD AUTO: 9.2 %
NEUTROPHILS # BLD AUTO: 3.29 X10 (3) UL (ref 1.5–7.7)
NEUTROPHILS # BLD AUTO: 3.29 X10(3) UL (ref 1.5–7.7)
NEUTROPHILS NFR BLD AUTO: 52.4 %
NONHDLC SERPL-MCNC: 236 MG/DL (ref ?–130)
OSMOLALITY SERPL CALC.SUM OF ELEC: 290 MOSM/KG (ref 275–295)
PATIENT FASTING Y/N/NP: YES
PATIENT FASTING Y/N/NP: YES
PLATELET # BLD AUTO: 222 10(3)UL (ref 150–450)
POTASSIUM SERPL-SCNC: 4.1 MMOL/L (ref 3.5–5.1)
RBC # BLD AUTO: 4.98 X10(6)UL (ref 3.8–5.3)
SODIUM SERPL-SCNC: 140 MMOL/L (ref 136–145)
TRIGL SERPL-MCNC: 151 MG/DL (ref 30–149)
TSI SER-ACNC: 2.02 MIU/ML (ref 0.36–3.74)
VIT B12 SERPL-MCNC: 296 PG/ML (ref 193–986)
VLDLC SERPL CALC-MCNC: 30 MG/DL (ref 0–30)
WBC # BLD AUTO: 6.3 X10(3) UL (ref 4–11)

## 2020-07-03 PROCEDURE — 80053 COMPREHEN METABOLIC PANEL: CPT

## 2020-07-03 PROCEDURE — 80061 LIPID PANEL: CPT

## 2020-07-03 PROCEDURE — 82607 VITAMIN B-12: CPT

## 2020-07-03 PROCEDURE — 85025 COMPLETE CBC W/AUTO DIFF WBC: CPT

## 2020-07-03 PROCEDURE — 36415 COLL VENOUS BLD VENIPUNCTURE: CPT

## 2020-07-03 PROCEDURE — 82306 VITAMIN D 25 HYDROXY: CPT

## 2020-07-03 PROCEDURE — 84443 ASSAY THYROID STIM HORMONE: CPT

## 2020-07-06 LAB — 25(OH)D3 SERPL-MCNC: 17.3 NG/ML (ref 30–100)

## 2020-07-06 NOTE — TELEPHONE ENCOUNTER
PPD Team,    Please use [facility] as a last resort.     Please complete the PA and we will use [facility] if not covered    thanks

## 2020-07-07 NOTE — TELEPHONE ENCOUNTER
PA attempted   Medication PA Requested: omeprazole 20 MG Oral Capsule Delayed Release                                                          CoverMyMeds Used: YES  Key:  Sig: Take 1 capsule (20 mg total) by mouth every morning.   DX Code:    Reflux David Ruiz

## 2020-07-08 DIAGNOSIS — E78.00 HYPERCHOLESTEREMIA: Primary | ICD-10-CM

## 2020-07-08 PROBLEM — E53.8 VITAMIN B12 DEFICIENCY: Status: ACTIVE | Noted: 2020-07-08

## 2020-07-08 PROBLEM — E55.9 VITAMIN D DEFICIENCY: Status: ACTIVE | Noted: 2020-07-08

## 2020-07-08 RX ORDER — ATORVASTATIN CALCIUM 20 MG/1
20 TABLET, FILM COATED ORAL NIGHTLY
Qty: 30 TABLET | Refills: 3 | Status: SHIPPED | OUTPATIENT
Start: 2020-07-08 | End: 2021-01-11

## 2020-07-08 NOTE — TELEPHONE ENCOUNTER
I spoke to the pt's daughter in law Lizz Gold. Pt gave verbal consent for me to talk to her    I had called [facility] and the prescription for the omeprazole was not picked up yet. It was over $60.00 for a 30 day supply.     I told the daughter in law she

## 2020-07-09 ENCOUNTER — TELEPHONE (OUTPATIENT)
Dept: GASTROENTEROLOGY | Facility: CLINIC | Age: 70
End: 2020-07-09

## 2020-07-09 NOTE — TELEPHONE ENCOUNTER
Please see 6/24/20 Prior authorization encounter PA was denied and [facility] was advised.  Contacted by GI RN

## 2020-07-09 NOTE — TELEPHONE ENCOUNTER
Patient's daughter in law (on HIPAA) and pharmacist notified that PA was denied and patient can go to [facility] for a coupon. Voiced understanding.

## 2020-07-09 NOTE — TELEPHONE ENCOUNTER
.  Current Outpatient Medications   Medication Sig Dispense Refill   • omeprazole 20 MG Oral Capsule Delayed Release Take 1 capsule (20 mg total) by mouth every morning.  90 capsule 3     Needs PA- call [phone]  ID #[id number]

## 2020-07-28 ENCOUNTER — HOSPITAL ENCOUNTER (OUTPATIENT)
Dept: MAMMOGRAPHY | Age: 70
Discharge: HOME | End: 2020-07-28
Attending: FAMILY MEDICINE
Payer: MEDICAID

## 2020-07-28 DIAGNOSIS — Z12.31 ENCOUNTER FOR SCREENING MAMMOGRAM FOR BREAST CANCER: ICD-10-CM

## 2020-07-28 PROCEDURE — 77063 BREAST TOMOSYNTHESIS BI: CPT | Performed by: FAMILY MEDICINE

## 2020-07-28 PROCEDURE — 77067 SCR MAMMO BI INCL CAD: CPT | Performed by: FAMILY MEDICINE

## 2020-09-01 ENCOUNTER — TELEPHONE (OUTPATIENT)
Dept: GASTROENTEROLOGY | Facility: CLINIC | Age: 70
End: 2020-09-01

## 2020-09-01 RX ORDER — HYDROCORTISONE 25 MG/ML
1 LOTION TOPICAL 2 TIMES DAILY
Qty: 118 ML | Refills: 1 | Status: SHIPPED | OUTPATIENT
Start: 2020-09-01

## 2020-09-01 NOTE — TELEPHONE ENCOUNTER
I spoke to the pt's daughter Janene Shen and she states her mother is experiencing pain after bowel movements    She is wondering your recommendation on either the hydrocortisone cream or suppositories.     Pt has had both of those medications  In the past

## 2020-09-01 NOTE — TELEPHONE ENCOUNTER
Stephen Sam states patient is having issues with hemorrhoids and requesting rx. Please call. Thank you.

## 2020-09-01 NOTE — TELEPHONE ENCOUNTER
She can see Dr. Robert Edgar if all the conservative measures are not working for exam and possible treatment    1. Use wet towelettes (Kleenix, Tucks, Dontae) to clean the anal area after bowel movements and then pat dry the area with dry toilet paper.     2.  D

## 2020-09-01 NOTE — TELEPHONE ENCOUNTER
Dr Jolie Lin,    I spoke to [facility] and went over all of your f/u instructions with her. She verbalizes understanding. Can you please send the hydrocortisone to her pharmacy. She asked for a f/u appt with you and at the time you had no openings.  She d

## 2020-09-09 ENCOUNTER — OFFICE VISIT (OUTPATIENT)
Dept: GASTROENTEROLOGY | Facility: CLINIC | Age: 70
End: 2020-09-09
Payer: MEDICAID

## 2020-09-09 VITALS
DIASTOLIC BLOOD PRESSURE: 83 MMHG | HEIGHT: 62 IN | SYSTOLIC BLOOD PRESSURE: 158 MMHG | WEIGHT: 147.19 LBS | HEART RATE: 81 BPM | TEMPERATURE: 98 F | BODY MASS INDEX: 27.08 KG/M2

## 2020-09-09 DIAGNOSIS — Z87.19 HISTORY OF IBS: ICD-10-CM

## 2020-09-09 DIAGNOSIS — K64.8 OTHER HEMORRHOIDS: Primary | ICD-10-CM

## 2020-09-09 DIAGNOSIS — Z87.19 HISTORY OF DIVERTICULITIS: ICD-10-CM

## 2020-09-09 PROCEDURE — 99214 OFFICE O/P EST MOD 30 MIN: CPT | Performed by: INTERNAL MEDICINE

## 2020-09-09 PROCEDURE — 3008F BODY MASS INDEX DOCD: CPT | Performed by: INTERNAL MEDICINE

## 2020-09-09 PROCEDURE — 3079F DIAST BP 80-89 MM HG: CPT | Performed by: INTERNAL MEDICINE

## 2020-09-09 PROCEDURE — 3077F SYST BP >= 140 MM HG: CPT | Performed by: INTERNAL MEDICINE

## 2020-09-09 NOTE — PATIENT INSTRUCTIONS
1. History of diverticulitis of colon  Second episode but prior was 6 years ago and this one was mild    NO surgery for diverticular pain at this time     2.  Gastroesophageal reflux disease without esophagitis  EGD without esophagitis  Worse with this ep

## 2020-10-05 ENCOUNTER — TELEPHONE (OUTPATIENT)
Dept: GASTROENTEROLOGY | Facility: CLINIC | Age: 70
End: 2020-10-05

## 2020-10-05 NOTE — TELEPHONE ENCOUNTER
Dr Ady Lin,    Daughter asking for medication you were going to prescribe for her IBS, Linzess. Pt is asking for it now. Can you please order it for the pt.     thanks

## 2020-10-05 NOTE — TELEPHONE ENCOUNTER
Pt's daughter Allison Zamudioed, pt was there states that pt had an office visit with  9-9-20 and wanted to give pt a medication and pt refused. Pt now wants the med but daughter does not remember the name.  Please advise

## 2020-10-06 RX ORDER — HYOSCYAMINE SULFATE 0.125 MG
0.12 TABLET ORAL EVERY 4 HOURS PRN
Qty: 30 TABLET | Refills: 0 | Status: SHIPPED | OUTPATIENT
Start: 2020-10-06

## 2020-11-14 NOTE — TELEPHONE ENCOUNTER
Refill Protocol Appointment Criteria  · Appointment scheduled in the past 12 months or in the next 3 months  Recent Outpatient Visits            1 week ago Abnormal uterine bleeding    [facility], [address], [place]

## 2021-01-08 ENCOUNTER — HOSPITAL ENCOUNTER (OUTPATIENT)
Age: 71
Discharge: HOME | End: 2021-01-08
Payer: MEDICAID

## 2021-01-08 VITALS
OXYGEN SATURATION: 99 % | DIASTOLIC BLOOD PRESSURE: 74 MMHG | WEIGHT: 147 LBS | TEMPERATURE: 98 F | HEIGHT: 62 IN | RESPIRATION RATE: 20 BRPM | HEART RATE: 81 BPM | BODY MASS INDEX: 27.05 KG/M2 | SYSTOLIC BLOOD PRESSURE: 162 MMHG

## 2021-01-08 DIAGNOSIS — N30.00 ACUTE CYSTITIS WITHOUT HEMATURIA: Primary | ICD-10-CM

## 2021-01-08 LAB
BILIRUB UR QL STRIP: NEGATIVE
CLARITY UR: CLEAR
COLOR UR: YELLOW
GLUCOSE UR STRIP-MCNC: NEGATIVE MG/DL
KETONES UR STRIP-MCNC: NEGATIVE MG/DL
NITRITE UR QL STRIP: NEGATIVE
PH UR STRIP: 6.5 [PH]
PROT UR STRIP-MCNC: NEGATIVE MG/DL
SP GR UR STRIP: 1.01
UROBILINOGEN UR STRIP-ACNC: <2 MG/DL

## 2021-01-08 PROCEDURE — 99214 OFFICE O/P EST MOD 30 MIN: CPT

## 2021-01-08 PROCEDURE — 87086 URINE CULTURE/COLONY COUNT: CPT | Performed by: EMERGENCY MEDICINE

## 2021-01-08 PROCEDURE — 81002 URINALYSIS NONAUTO W/O SCOPE: CPT

## 2021-01-08 RX ORDER — CEPHALEXIN 250 MG/1
250 CAPSULE ORAL 4 TIMES DAILY
Qty: 28 CAPSULE | Refills: 0 | Status: SHIPPED | OUTPATIENT
Start: 2021-01-08 | End: 2021-01-08

## 2021-01-08 RX ORDER — CEPHALEXIN 500 MG/1
500 TABLET ORAL 3 TIMES DAILY
Qty: 21 TABLET | Refills: 0 | Status: SHIPPED | OUTPATIENT
Start: 2021-01-08 | End: 2021-01-15

## 2021-01-08 RX ORDER — CEPHALEXIN 250 MG/1
250 CAPSULE ORAL 4 TIMES DAILY
Qty: 28 CAPSULE | Refills: 0 | OUTPATIENT
Start: 2021-01-08 | End: 2021-01-08

## 2021-01-08 NOTE — ED INITIAL ASSESSMENT (HPI)
PATIENT WITH BURNING WITH URINATION STARTING YESTERDAY. DENIES BLOOD IN URINE, DENIES NAUSEA VOMITING. REPORTS SOME LOWER BACK PAIN.

## 2021-01-08 NOTE — ED PROVIDER NOTES
Patient Seen in: [facility]      History   Patient presents with:  Urinary Symptoms    Stated Complaint: burning when urinating     HPI/Subjective: Well appearing 68 y/o female presents with c/o burning with urinaiton for the past 2 days.  D pain, frequency, urgency, vaginal bleeding and vaginal discharge. Musculoskeletal: Negative for back pain. Skin: Negative for color change. Neurological: Negative for dizziness and headaches.        Positive for stated complaint: burning when urinatin Discussed case with Dr. Lit Diamond who agrees with plan of care.                           Disposition and Plan     Clinical Impression:  Acute cystitis without hematuria  (primary encounter diagnosis)    Disposition:  Discharge  1/8/2021  3:46 pm    [employer]

## 2021-01-11 ENCOUNTER — TELEMEDICINE (OUTPATIENT)
Dept: FAMILY MEDICINE CLINIC | Facility: CLINIC | Age: 71
End: 2021-01-11

## 2021-01-11 DIAGNOSIS — N30.00 ACUTE CYSTITIS WITHOUT HEMATURIA: Primary | ICD-10-CM

## 2021-01-11 DIAGNOSIS — E78.00 HYPERCHOLESTEREMIA: ICD-10-CM

## 2021-01-11 DIAGNOSIS — E55.9 VITAMIN D DEFICIENCY: ICD-10-CM

## 2021-01-11 PROCEDURE — 99214 OFFICE O/P EST MOD 30 MIN: CPT | Performed by: FAMILY MEDICINE

## 2021-01-11 NOTE — PROGRESS NOTES
This visit is conducted using Telemedicine with live, interactive video and audio during this Coronavirus pandemic. Please note that the following visit was completed using two-way, real-time interactive audio and/or video communication.   This has been and would like to retest for cholesterol    Also states her vitamin D levels are low and would like weekly prescription    Denies any back pain, fever or chills. Denies any blood in urine.       ROS  A comprehensive 10 point review of systems was completed 7/2/2020 ), Disp: 90 tablet, Rfl: 3  •  gabapentin 100 MG Oral Cap, Take 1 capsule (100 mg total) by mouth 3 (three) times daily.  (Patient not taking: Reported on 7/2/2020 ), Disp: 90 capsule, Rfl: 0    ALLERGY LIST    Bactrim [Sulfametho*    SWELLING, Tig from IC  Take antibiotic as directed  Push fluids  Cranberry juice  Wipe from front to back  No feminine products  Return with concerns      2. Hypercholesteremia  Patient was unable to tolerate 3 statins. She has been watching her diet.   Recheck choleste

## 2021-01-13 ENCOUNTER — LAB ENCOUNTER (OUTPATIENT)
Dept: LAB | Age: 71
End: 2021-01-13
Attending: FAMILY MEDICINE
Payer: MEDICAID

## 2021-01-13 DIAGNOSIS — E78.00 HYPERCHOLESTEREMIA: ICD-10-CM

## 2021-01-13 LAB
ALBUMIN SERPL-MCNC: 3.9 G/DL (ref 3.4–5)
ALBUMIN/GLOB SERPL: 1 {RATIO} (ref 1–2)
ALP LIVER SERPL-CCNC: 117 U/L
ALT SERPL-CCNC: 20 U/L
ANION GAP SERPL CALC-SCNC: 4 MMOL/L (ref 0–18)
AST SERPL-CCNC: 17 U/L (ref 15–37)
BILIRUB SERPL-MCNC: 0.5 MG/DL (ref 0.1–2)
BUN BLD-MCNC: 13 MG/DL (ref 7–18)
BUN/CREAT SERPL: 17.6 (ref 10–20)
CALCIUM BLD-MCNC: 9.9 MG/DL (ref 8.5–10.1)
CHLORIDE SERPL-SCNC: 100 MMOL/L (ref 98–112)
CHOLEST SMN-MCNC: 281 MG/DL (ref ?–200)
CO2 SERPL-SCNC: 29 MMOL/L (ref 21–32)
CREAT BLD-MCNC: 0.74 MG/DL
GLOBULIN PLAS-MCNC: 3.9 G/DL (ref 2.8–4.4)
GLUCOSE BLD-MCNC: 94 MG/DL (ref 70–99)
HDLC SERPL-MCNC: 58 MG/DL (ref 40–59)
LDLC SERPL CALC-MCNC: 199 MG/DL (ref ?–100)
M PROTEIN MFR SERPL ELPH: 7.8 G/DL (ref 6.4–8.2)
NONHDLC SERPL-MCNC: 223 MG/DL (ref ?–130)
OSMOLALITY SERPL CALC.SUM OF ELEC: 276 MOSM/KG (ref 275–295)
PATIENT FASTING Y/N/NP: YES
PATIENT FASTING Y/N/NP: YES
POTASSIUM SERPL-SCNC: 4.1 MMOL/L (ref 3.5–5.1)
SODIUM SERPL-SCNC: 133 MMOL/L (ref 136–145)
TRIGL SERPL-MCNC: 122 MG/DL (ref 30–149)
VLDLC SERPL CALC-MCNC: 24 MG/DL (ref 0–30)

## 2021-01-13 PROCEDURE — 80061 LIPID PANEL: CPT

## 2021-01-13 PROCEDURE — 80053 COMPREHEN METABOLIC PANEL: CPT

## 2021-01-13 PROCEDURE — 36415 COLL VENOUS BLD VENIPUNCTURE: CPT

## 2021-01-23 DIAGNOSIS — E78.00 HYPERCHOLESTEREMIA: Primary | ICD-10-CM

## 2021-02-23 NOTE — ADDENDUM NOTE
Addended by: Siva Wilson on: 4/2/7699 62:83 PM     Modules accepted: Orders Star Wedge Flap Text: The defect edges were debeveled with a #15 scalpel blade.  Given the location of the defect, shape of the defect and the proximity to free margins a star wedge flap was deemed most appropriate.  Using a sterile surgical marker, an appropriate rotation flap was drawn incorporating the defect and placing the expected incisions within the relaxed skin tension lines where possible. The area thus outlined was incised deep to adipose tissue with a #15 scalpel blade.  The skin margins were undermined to an appropriate distance in all directions utilizing iris scissors.

## 2023-02-20 NOTE — TELEPHONE ENCOUNTER
Spoke with Melanie dtander in law ([facility]) and informed her of KK/AMA message below. She verified pt is still taking the antibiotic prescribed below and symptoms improved, pt denies fever, frequency. Advised to call if has questions or concerns.  Chris Reid Quality 110: Preventive Care And Screening: Influenza Immunization: Influenza Immunization previously received during influenza season Detail Level: Detailed Quality 226: Preventive Care And Screening: Tobacco Use: Screening And Cessation Intervention: Patient screened for tobacco use and is an ex/non-smoker Quality 431: Preventive Care And Screening: Unhealthy Alcohol Use - Screening: Patient not identified as an unhealthy alcohol user when screened for unhealthy alcohol use using a systematic screening method

## 2023-03-07 NOTE — ASSESSMENT & PLAN NOTE
No change in appearance of optic nerve. OCT and VF testing last year was completely normal. One year EE VF OCT.
No treatment is required. Will continue to observe.
[phone]

## 2023-10-14 ENCOUNTER — HOSPITAL ENCOUNTER (OUTPATIENT)
Age: 73
Discharge: HOME | End: 2023-10-14
Payer: MEDICARE

## 2023-10-14 VITALS
OXYGEN SATURATION: 98 % | SYSTOLIC BLOOD PRESSURE: 152 MMHG | DIASTOLIC BLOOD PRESSURE: 60 MMHG | TEMPERATURE: 97 F | RESPIRATION RATE: 20 BRPM | HEART RATE: 71 BPM

## 2023-10-14 DIAGNOSIS — R30.0 DYSURIA: Primary | ICD-10-CM

## 2023-10-14 LAB
BILIRUB UR QL STRIP: NEGATIVE
COLOR UR: YELLOW
GLUCOSE UR STRIP-MCNC: NEGATIVE MG/DL
KETONES UR STRIP-MCNC: NEGATIVE MG/DL
LEUKOCYTE ESTERASE UR QL STRIP: NEGATIVE
NITRITE UR QL STRIP: NEGATIVE
PH UR STRIP: 7 [PH]
PROT UR STRIP-MCNC: NEGATIVE MG/DL
SP GR UR STRIP: 1.01
UROBILINOGEN UR STRIP-ACNC: <2 MG/DL

## 2023-10-14 PROCEDURE — 99214 OFFICE O/P EST MOD 30 MIN: CPT

## 2023-10-14 PROCEDURE — 87086 URINE CULTURE/COLONY COUNT: CPT | Performed by: NURSE PRACTITIONER

## 2023-10-14 PROCEDURE — 81002 URINALYSIS NONAUTO W/O SCOPE: CPT

## 2023-10-14 RX ORDER — CIPROFLOXACIN 250 MG/1
250 TABLET, FILM COATED ORAL 2 TIMES DAILY
Qty: 6 TABLET | Refills: 0 | Status: SHIPPED | OUTPATIENT
Start: 2023-10-14 | End: 2023-10-17

## 2023-10-14 NOTE — DISCHARGE INSTRUCTIONS
Please take antibiotics as prescribed. We have sent the urine for culture, we will notify you in 2 to 3 days with results. Any fever, worsening pain, vomiting go to the emergency department.

## (undated) DEVICE — SOL  .9 1000ML BTL

## (undated) DEVICE — MINOR GENERAL: Brand: MEDLINE INDUSTRIES, INC.

## (undated) DEVICE — ENDOSCOPY PACK - LOWER: Brand: MEDLINE INDUSTRIES, INC.

## (undated) DEVICE — Device

## (undated) DEVICE — SUTURE SILK 2-0 SH

## (undated) DEVICE — Device: Brand: DEFENDO AIR/WATER/SUCTION AND BIOPSY VALVE

## (undated) DEVICE — FLEXIBLE YANKAUER,MEDIUM TIP, NO VACUUM CONTROL: Brand: ARGYLE

## (undated) DEVICE — DRAPE SHEET TRANSVERSE LAP

## (undated) DEVICE — SUTURE MONOCRYL 4-0 PS-2

## (undated) DEVICE — ACCUVAC SMOKE ATTACHMENT

## (undated) DEVICE — DRAPE SHEET LG

## (undated) DEVICE — FORCEP RADIAL JAW 4

## (undated) DEVICE — ENDOSCOPY PACK UPPER: Brand: MEDLINE INDUSTRIES, INC.

## (undated) DEVICE — SUTURE VICRYL 3-0 SH

## (undated) NOTE — LETTER
[facility]  Administration of Anesthesia  1. I, Flaquita Silverio, or _________________________________ acting on her behalf, (Patient) (Dependent/Representative) request to receive anesthesia for my pending procedure/operation/treatment.   A infections, high spinal block, spinal bleeding, seizure, cardiac arrest and death. 7. AWARENESS: I understand that it is possible (but unlikely) to have explicit memory of events from the operating room while under general anesthesia.   8. ELECTROCONVULSIV unconscious pt /Relationship    My signature below affirms that prior to the time of the procedure, I have explained to the patient and/or his/her guardian, the risks and benefits of undergoing anesthesia, as well as any reasonable alternatives.     _______

## (undated) NOTE — LETTER
06/24/20        [address] [place] [id number]      Dear Marden Mortimer,    [facility] records indicate that you have outstanding lab work and or testing that was ordered for you and has not yet been completed:   XR ABDOMEN (1 VIEW) (CPT=7401

## (undated) NOTE — MR AVS SNAPSHOT
[facility] SPECIALTY [facility] - [place] [address] [address] [address] [phone]               Thank you for choosing us for your health care visit with Stefanie Martinez MD.  We are glad to serve you and happy to provide you with this summary of yo Commonly known as:  PRAVACHOL                   Results of Recent Testing     URINALYSIS NONAUTO W/O SCOPE      Component Value Standard Range & Units    GLUCOSE (URINE DIPSTICK) negative Negative mg/dL    BILIRUBIN negative Negative    KETONES (URINE DIPS

## (undated) NOTE — ED AVS SNAPSHOT
Kimberly Pathak   MRN: [id number]    Department:  [facility] Emergency Department   Date of Visit:  7/31/2018           Disclosure     Insurance plans vary and the physician(s) referred by the ER may not be covered by your plan.  Please contact y within the next three months to obtain basic health screening including reassessment of your blood pressure.     IF THERE IS ANY CHANGE OR WORSENING OF YOUR CONDITION, CALL YOUR PRIMARY CARE PHYSICIAN AT ONCE OR RETURN IMMEDIATELY TO THE EMERGENCY DEPARTMEN

## (undated) NOTE — LETTER
01/24/19        Noah Yates  [address]      Dear Baocharley Good,    [facility] records indicate that you have outstanding lab work and or testing that was ordered for you and has not yet been completed:  Orders Placed This Encounter      L

## (undated) NOTE — LETTER
September 10, 2018     Devora Pinedo  [address] [address]      Dear Vicky Vargas:    Below are the results from your recent visit:  Urinalysis is normal    Resulted Orders   URINALYSIS NONAUTO W/O SCOPE   Result Value Ref Range    GLUCOSE (

## (undated) NOTE — LETTER
[address], [place], [place]     AUTHORIZATION FOR SURGICAL OPERATION OR PROCEDURE    I hereby authorize                                               my Physician(s) and whomever may be designated as the MESHA Delaney 4. I consent to the photographing of procedure(s) to be performed for the purposes of advancing medicine, science and/or education, provided my identity is not revealed.  If the procedure has been videotaped, the physician/surgeon will obtain the original v (Witness signature)                                                                                                  (Date)                                (Time)  STATEMENT OF PHYSICIAN My signature below affirms that prior to the time of the procedure;  I

## (undated) NOTE — LETTER
January 25, 2018    Maria M Brown MD  [address] [facility]     Patient: April Green   YOB: 1950   Date of Visit: 1/25/2018       Dear Dr. Veto Granados MD:    Thank you for referring April Green to me for evaluation.  Here Negative for: Constitutional, Gastrointestinal, Neurological, Skin, Genitourinary, Musculoskeletal, HENT, Endocrine, Cardiovascular, Eyes, Respiratory, Psychiatric, Allergic/Imm, Heme/Lymph    Last edited by Luisa Cuadra on 1/25/2018  1:23 PM. (History) Diagnoses and Plan:     Age-related nuclear cataract of both eyes  Discussed moderate cataracts with patient. No treatment recommended at this time. New glasses today; suggest update. Floater, vitreous, bilateral  No treatment.      Glaucoma suspect

## (undated) NOTE — LETTER
[facility]  Administration of Anesthesia  1. I, Flaquita Silverio, or _________________________________ acting on her behalf, (Patient) (Dependent/Representative) request to receive anesthesia for my pending procedure/operation/treatment.   A infections, high spinal block, spinal bleeding, seizure, cardiac arrest and death. 7. AWARENESS: I understand that it is possible (but unlikely) to have explicit memory of events from the operating room while under general anesthesia.   8. ELECTROCONVULSIV unconscious pt /Relationship    My signature below affirms that prior to the time of the procedure, I have explained to the patient and/or his/her guardian, the risks and benefits of undergoing anesthesia, as well as any reasonable alternatives.     _______

## (undated) NOTE — LETTER
Kelly Keen [address], [place], [place]  [address]  INFORMED CONSENT FOR TRANSFUSION OF BLOOD OR BLOOD PRODUCTS  My physician has informed me of the nature, purpose, benefits and risks of transfusion for blood and blood components that ______________________________________________  (Signature of Patient)                                                            (Responsible party in case of Minor,

## (undated) NOTE — LETTER
[address] [place] [address] Rd, [place], [place]     AUTHORIZATION FOR SURGICAL OPERATION OR PROCEDURE    I hereby authorize Dr. Srinivas Rebolledo MD, my Physician(s) and whomever may be designated as the doctor's Assistant, to perform the f 4. I consent to the photographing of procedure(s) to be performed for the purposes of advancing medicine, science and/or education, provided my identity is not revealed.  If the procedure has been videotaped, the physician/surgeon will obtain the original v (Witness signature)                                                                                                  (Date)                                (Time)  STATEMENT OF PHYSICIAN My signature below affirms that prior to the time of the procedure;  I

## (undated) NOTE — LETTER
10/10/18        April Green  [address]      Dear Marizol Walker,    [facility] records indicate that you have outstanding lab work and or testing that was ordered for you and has not yet been completed:  Orders Placed This Encounter      P

## (undated) NOTE — LETTER
03/15/19        Nickolas Virk  [address] [address]      Dear Thang Longoria,    [facility] records indicate that you have outstanding lab work and or testing that was ordered for you and has not yet been completed:  Orders Placed This Encounter

## (undated) NOTE — MR AVS SNAPSHOT
[facility] SPECIALTY [facility] - [place] [address] [address] [id number] [phone]               Thank you for choosing us for your health care visit with HERMILA Calhoun.   We are glad to serve you and happy to provide you with this summary of POC Urinalysis, Manual Dip without microscopy [[address]]    Complete by:  As directed    Assoc Dx:  Urinary tract infection, site unspecified [N39.0]           Urine Culture, Routine [E]    Complete by:  Feb 20, 2017 (Approximate)    Assoc Dx:  Urinary tract Moderation of alcohol consumption Men: limit to <= 2 drinks* per day. Women and lighter weight persons: limit to <= 1 drink* per day.               Visit [facility] online at  [email or web address]

## (undated) NOTE — LETTER
[address] [place] [address]  Authorization for Invasive Procedures  1.  I hereby authorize Dr. Solo Altman, my physician and whomever may be designated as the doctor's assistant, to perform the following operation and/or procedure:  Colonoscopy performed for the purposes of advancing medicine, science, and/or education, provided my identity is not revealed. If the procedure has been videotaped, the physician/surgeon will obtain the original videotape.  The hospital will not be responsible for stor My signature below affirms that prior to the time of the procedure, I have explained to the patient and/or her legal representative, the risks and benefits involved in the proposed treatment and any reasonable alternative to the proposed treatment.  I have

## (undated) NOTE — LETTER
AUTHORIZATION FOR SURGICAL OPERATION OR OTHER PROCEDURE    1.  I hereby authorize Dr. Mina Carcamo and [facility] staff assigned to my case to perform the following operation and/or procedure at the [facility] [place] ___SULTANA,SALEEM___________________________________________________  (please print)      Patient signature:  ___________________________________________________             Relationship to Patient:           []  Parent    Responsible person

## (undated) NOTE — LETTER
[address] [place] [address]  Authorization for Surgical Operation or Procedure  Date: ______________       Time: _______________  1.  I hereby authorize Dr. Wicho Handley, my physician and the assistant, to perform the following operation and/or pr 5. I consent to the photographing of the operations or procedures to be performed for the purposes of advancing medicine, science, and/or education, provided my identity is not revealed.  If the procedure has been videotaped, the physician/surgeon will obta risks and benefits involved in the proposed treatment and any reasonable alternative to the proposed treatment. I have also explained the risks and benefits involved in the refusal of the proposed treatment and have answered the patient's questions.  If I h

## (undated) NOTE — LETTER
7/8/2019                     Dear Claudia Durant,      The order for your pelvic ultrasound was approved and you can call central scheduling at [phone] to set up the appointment for the ultrasound. Please let us know if you have any questions or concerns. Thank you!      Sincerely,    [facility] RN   [address], [place]  [address] [address]  [place]   [phone]

## (undated) NOTE — LETTER
[facility]  Administration of Anesthesia  1. I, Gilbert España, or _________________________________ acting on her behalf, (Patient) (Dependent/Representative) request to receive anesthesia for my pending procedure/operation/treatment.   A infections, high spinal block, spinal bleeding, seizure, cardiac arrest and death. 7. AWARENESS: I understand that it is possible (but unlikely) to have explicit memory of events from the operating room while under general anesthesia.   8. ELECTROCONVULSIV unconscious pt /Relationship    My signature below affirms that prior to the time of the procedure, I have explained to the patient and/or his/her guardian, the risks and benefits of undergoing anesthesia, as well as any reasonable alternatives.     _______

## (undated) NOTE — MR AVS SNAPSHOT
[facility] SPECIALTY [facility] - [address]  [address] [phone]               Thank you for choosing us for your health care visit with Ekaterina Martinez MD.  We are glad to serve you and happy to provide you with this summary of yo clinic staff will provide you with the phone number you should call to schedule your appointment.      If you are confident that your benefit plan will not require a referral or authorization, such as [place], please feel free to schedule your geraldo DASH eating plan Adopt a diet rich in fruits, vegetables, and low fat dairy products with reduced content of saturated and total fat.    Dietary sodium reduction Reduce dietary sodium intake to <= 100 mmol per day (2.4 g sodium or 6 g sodium chloride)   Aer including white bread, rice and pasta   Eat plenty of protein, keep the fat content low Sugars:  sodas and sports drinks, candies and desserts   Eat plenty of low-fat dairy products High fat meats and dairy   Choose whole grain products Foods high in sodiu

## (undated) NOTE — LETTER
05/04/18        Cricket Barros  [address]      Dear Marino Mcdermott records indicate that you have outstanding lab work and or testing that was ordered for you and has not yet been completed:          Lipid Panel [E]      Comp Metab

## (undated) NOTE — LETTER
AUTHORIZATION FOR SURGICAL OPERATION OR OTHER PROCEDURE    1.  I hereby authorize Dr. Patsy Mendoza, [facility] assigned to my case to perform the following operation and/or procedure at the [facility]:       [place] with possible biopsy Relationship to Patient:           []  Parent    Responsible person                          []  Spouse  In case of minor or                    [] Other  _____________   Incompetent name:  __________________________________________________